# Patient Record
Sex: FEMALE | Race: ASIAN | NOT HISPANIC OR LATINO | Employment: FULL TIME | URBAN - METROPOLITAN AREA
[De-identification: names, ages, dates, MRNs, and addresses within clinical notes are randomized per-mention and may not be internally consistent; named-entity substitution may affect disease eponyms.]

---

## 2017-09-13 ENCOUNTER — PATIENT OUTREACH (OUTPATIENT)
Dept: ADMINISTRATIVE | Facility: HOSPITAL | Age: 27
End: 2017-09-13

## 2017-09-27 ENCOUNTER — OFFICE VISIT (OUTPATIENT)
Dept: INTERNAL MEDICINE | Facility: CLINIC | Age: 27
End: 2017-09-27
Attending: FAMILY MEDICINE
Payer: COMMERCIAL

## 2017-09-27 VITALS
OXYGEN SATURATION: 97 % | SYSTOLIC BLOOD PRESSURE: 128 MMHG | WEIGHT: 142.44 LBS | HEIGHT: 68 IN | DIASTOLIC BLOOD PRESSURE: 68 MMHG | HEART RATE: 57 BPM | BODY MASS INDEX: 21.59 KG/M2

## 2017-09-27 DIAGNOSIS — Z12.4 PAP SMEAR FOR CERVICAL CANCER SCREENING: ICD-10-CM

## 2017-09-27 DIAGNOSIS — H18.609 KERATOCONUS, UNSPECIFIED LATERALITY: ICD-10-CM

## 2017-09-27 DIAGNOSIS — Z00.00 ANNUAL PHYSICAL EXAM: Primary | ICD-10-CM

## 2017-09-27 PROCEDURE — 99999 PR PBB SHADOW E&M-EST. PATIENT-LVL III: CPT | Mod: PBBFAC,,, | Performed by: FAMILY MEDICINE

## 2017-09-27 PROCEDURE — 99395 PREV VISIT EST AGE 18-39: CPT | Mod: S$GLB,,, | Performed by: FAMILY MEDICINE

## 2017-09-27 NOTE — PROGRESS NOTES
"Subjective:      Patient ID: Shikha Lassiter is a 27 y.o. female.    Chief Complaint: Annual Exam    She is here for annual exam. Her left ear has been hurting for two weeks. No fevers, sinus pressure, runny nose. No worsening edema.       Review of Systems   Constitutional: Negative.    HENT: Negative.    Respiratory: Negative.    Cardiovascular: Negative.    Gastrointestinal: Negative.    Genitourinary: Negative.    Neurological: Negative.      I personally reviewed Past Medical History, Past Surgical history,  Past Social History and Family History    Objective:   /68   Pulse (!) 57   Ht 5' 8" (1.727 m)   Wt 64.6 kg (142 lb 6.7 oz)   SpO2 97%   BMI 21.65 kg/m²     Physical Exam   Constitutional: She is oriented to person, place, and time. She appears well-developed and well-nourished. No distress.   HENT:   Head: Normocephalic and atraumatic.   Right Ear: Hearing, tympanic membrane, external ear and ear canal normal.   Left Ear: Hearing, tympanic membrane, external ear and ear canal normal.   Nose: Nose normal.   Mouth/Throat: Uvula is midline and oropharynx is clear and moist.   Eyes: Conjunctivae and EOM are normal. Pupils are equal, round, and reactive to light. Right eye exhibits no discharge. Left eye exhibits no discharge. No scleral icterus.   Neck: Normal range of motion. Neck supple.   Cardiovascular: Normal rate, regular rhythm, normal heart sounds and intact distal pulses.  Exam reveals no gallop.    No murmur heard.  Pulmonary/Chest: Effort normal and breath sounds normal. No respiratory distress. She has no wheezes. She has no rales. She exhibits no tenderness.   Abdominal: Soft. Bowel sounds are normal. She exhibits no distension and no mass. There is no tenderness. There is no rebound and no guarding.   Musculoskeletal: Normal range of motion.   Neurological: She is alert and oriented to person, place, and time.   Skin: Skin is warm and dry.   Psychiatric: She has a normal mood and affect. " Her behavior is normal. Judgment and thought content normal.   Vitals reviewed.      Shikha was seen today for annual exam.    Diagnoses and all orders for this visit:    Annual physical exam  -patient to send in labs completed 4 months ago     Pap smear for cervical cancer screening  -declined pap smear   -     Ambulatory referral to Obstetrics / Gynecology    Keratoconus, unspecified laterality  -follow up with optho in the next few weeks     Ear pain  -negative exam, she will call if no improvement, she will trial mouth guard

## 2017-10-24 ENCOUNTER — INITIAL CONSULT (OUTPATIENT)
Dept: OPHTHALMOLOGY | Facility: CLINIC | Age: 27
End: 2017-10-24
Attending: OPHTHALMOLOGY
Payer: COMMERCIAL

## 2017-10-24 DIAGNOSIS — H18.603 KERATOCONUS OF BOTH EYES: Primary | ICD-10-CM

## 2017-10-24 PROCEDURE — 92004 COMPRE OPH EXAM NEW PT 1/>: CPT | Mod: S$GLB,,, | Performed by: OPHTHALMOLOGY

## 2017-10-24 PROCEDURE — 99999 PR PBB SHADOW E&M-EST. PATIENT-LVL II: CPT | Mod: PBBFAC,,, | Performed by: OPHTHALMOLOGY

## 2017-10-24 NOTE — PROGRESS NOTES
HPI     26 y/o female presents for [facility] Evaluation. Previous patient of Dr Mcnally   and Dr Gee.  Pt states OS is very steep.  Not seeing well out of it even with CTL   (soft)  Allergic to OptiFree  No Drops.     Last edited by Esmer Medina on 10/24/2017  3:23 PM. (History)            Assessment /Plan     For exam results, see Encounter Report.    Keratoconus of both eyes      The diagnosis of corneal ectasia and its etiology and clinical course were discussed.  Treatment with the use of specialty contact lenses, collagen cross linking, and corneal transplantation was explained in detail.  Plan  CXL OS first, then OD

## 2017-10-27 ENCOUNTER — TELEPHONE (OUTPATIENT)
Dept: OPHTHALMOLOGY | Facility: CLINIC | Age: 27
End: 2017-10-27

## 2017-10-27 NOTE — TELEPHONE ENCOUNTER
Left message for patient to assist with scheduling Crosslinking procedure appointment with Dr. Miller.

## 2017-10-27 NOTE — TELEPHONE ENCOUNTER
----- Message from Mariann Riggins MA sent at 10/24/2017  4:35 PM CDT -----  MD Mariann Garcia MA         CXL OS first, then OD later   Self Pay

## 2017-10-30 ENCOUNTER — TELEPHONE (OUTPATIENT)
Dept: OPHTHALMOLOGY | Facility: CLINIC | Age: 27
End: 2017-10-30

## 2017-10-30 NOTE — TELEPHONE ENCOUNTER
Called and spoke to patient and she will give me a call back to see if she is going out of town for [place].

## 2017-12-03 ENCOUNTER — PATIENT MESSAGE (OUTPATIENT)
Dept: INTERNAL MEDICINE | Facility: CLINIC | Age: 27
End: 2017-12-03

## 2017-12-07 ENCOUNTER — TELEPHONE (OUTPATIENT)
Dept: INTERNAL MEDICINE | Facility: CLINIC | Age: 27
End: 2017-12-07

## 2017-12-13 ENCOUNTER — TELEPHONE (OUTPATIENT)
Dept: OPHTHALMOLOGY | Facility: CLINIC | Age: 27
End: 2017-12-13

## 2017-12-13 NOTE — TELEPHONE ENCOUNTER
Called patient to help assist with scheduling. Crosslinking procedure. Patient mailbox was full. Was not able to leave a message.

## 2017-12-13 NOTE — TELEPHONE ENCOUNTER
Patient called and mentioned that she wants to hold off the Crosslinking procedure until the summer when she can take the time off. Informed patient that I will forward the message over to the provider.

## 2018-06-11 ENCOUNTER — HOSPITAL ENCOUNTER (EMERGENCY)
Facility: HOSPITAL | Age: 28
Discharge: HOME | End: 2018-06-11
Attending: EMERGENCY MEDICINE
Payer: COMMERCIAL

## 2018-06-11 VITALS
HEART RATE: 64 BPM | RESPIRATION RATE: 16 BRPM | WEIGHT: 143 LBS | OXYGEN SATURATION: 100 % | HEIGHT: 68 IN | SYSTOLIC BLOOD PRESSURE: 106 MMHG | BODY MASS INDEX: 21.67 KG/M2 | DIASTOLIC BLOOD PRESSURE: 66 MMHG | TEMPERATURE: 98 F

## 2018-06-11 DIAGNOSIS — M54.2 NECK PAIN: ICD-10-CM

## 2018-06-11 DIAGNOSIS — S02.2XXA CLOSED FRACTURE OF NASAL BONE, INITIAL ENCOUNTER: Primary | ICD-10-CM

## 2018-06-11 LAB
B-HCG UR QL: NEGATIVE
CTP QC/QA: YES

## 2018-06-11 PROCEDURE — 99284 EMERGENCY DEPT VISIT MOD MDM: CPT | Mod: 25

## 2018-06-11 PROCEDURE — 99283 EMERGENCY DEPT VISIT LOW MDM: CPT | Mod: ,,, | Performed by: EMERGENCY MEDICINE

## 2018-06-11 PROCEDURE — 81025 URINE PREGNANCY TEST: CPT | Performed by: EMERGENCY MEDICINE

## 2018-06-11 NOTE — ED TRIAGE NOTES
Slipped and fell onto face. , struck nose- she pushed it back into place. States she was sweating  after fall and bottom lip began bleeding. C/O h/a , rt jaw pain and neck pain . Small lac to left lower lip. Denies LOC.AAOX4, skin w/d, respirations even and unlabored.

## 2018-06-11 NOTE — ED PROVIDER NOTES
"Encounter Date: 6/11/2018    SCRIBE #1 NOTE: I, Heather Huerta, am scribing for, and in the presence of,  Dr. Romeo . I have scribed the following portions of the note - Other sections scribed: HPI, ROS, PE.       History     Chief Complaint   Patient presents with    Facial Injury     slipped and fell hit nose , having nauea, was sweaty, denies loc, headaches     Time patient was seen by the provider: 1:49 PM      The patient is a 28 y.o. female with no known co-morbidities who presents to the ED with a complaint of a severe HA, nausea, neck pain, nasal swelling, lip laceration, and a moderate nosebleed s/p fall. The patient states that she was walking downstairs, tripped and fell face forward. She states that she thought her nose was broken at the time, and her father was able to "pop it back in place." She denies LOC, weakness or numbness, or loose teeth.         The history is provided by the patient and a relative.     Review of patient's allergies indicates:  No Known Allergies  Past Medical History:   Diagnosis Date    Keratoconus     Medical history non-contributory      Past Surgical History:   Procedure Laterality Date    WISDOM TOOTH EXTRACTION       Family History   Problem Relation Age of Onset    Diabetes Father     Stroke Father     Polycystic kidney disease Father     Diabetes Maternal Grandmother     No Known Problems Mother     No Known Problems Sister     No Known Problems Brother     No Known Problems Maternal Aunt     No Known Problems Maternal Uncle     No Known Problems Paternal Aunt     No Known Problems Paternal Uncle     No Known Problems Maternal Grandfather     No Known Problems Paternal Grandmother     No Known Problems Paternal Grandfather     Blindness Neg Hx     Cataracts Neg Hx     Glaucoma Neg Hx     Macular degeneration Neg Hx     Retinal detachment Neg Hx     Strabismus Neg Hx     Thyroid disease Neg Hx     Amblyopia Neg Hx     Cancer Neg Hx     " Hypertension Neg Hx      Social History   Substance Use Topics    Smoking status: Never Smoker    Smokeless tobacco: Never Used    Alcohol use No     Review of Systems   HENT: Positive for nosebleeds.         (+) nasal bridge swelling  (+) lip laceration  (-) loose teeth   Gastrointestinal: Positive for nausea.   Musculoskeletal: Positive for neck pain.   Neurological: Positive for headaches. Negative for syncope, weakness and numbness.   All other systems reviewed and are negative.      Physical Exam     Initial Vitals [06/11/18 1344]   BP Pulse Resp Temp SpO2   127/81 64 18 97.9 °F (36.6 °C) 98 %      MAP       --         Physical Exam    Nursing note and vitals reviewed.  Constitutional: She appears well-developed and well-nourished.   HENT:   Head: Normocephalic and atraumatic.   Swelling and tenderness to bridge of nose. Minimal right jaw tenderness. Left lateral lip laceration, superficial not suturable.    Eyes: Conjunctivae and EOM are normal. Pupils are equal, round, and reactive to light.   Neck: Normal range of motion. Neck supple.   Abdominal: Soft. Bowel sounds are normal. She exhibits no distension. There is no tenderness.   Musculoskeletal: Normal range of motion. She exhibits no edema.        Cervical back: She exhibits tenderness.   Normal gait.    Neurological: She is alert and oriented to person, place, and time. No sensory deficit.   Skin: Skin is warm and dry. Capillary refill takes less than 2 seconds. No rash noted.   Psychiatric: She has a normal mood and affect.         ED Course   Procedures  Labs Reviewed   POCT URINE PREGNANCY          CT Maxillofacial Without Contrast   Final Result      Somewhat comminuted fracture of the nasal bones bilaterally fracture deformity of the nasal bones bilaterally more prominent on the right.      Otherwise unremarkable noncontrast CT maxillofacial bones as detailed above      Clinical correlation advised         Electronically signed by: Jacoby Goodman  DO   Date:    06/11/2018   Time:    14:56      CT Head Without Contrast   Final Result      Unremarkable noncontrast CT head specifically without evidence for acute intracranial hemorrhage.  Clinical correlation and further evaluation as warranted.         Electronically signed by: Jacoby Goodman DO   Date:    06/11/2018   Time:    14:51      X-Ray Cervical Spine AP And Lateral   Final Result      Normal exam         Electronically signed by: Cristi Torres MD   Date:    06/11/2018   Time:    14:51           Medical Decision Making:   History:   Old Medical Records: I decided to obtain old medical records.  Clinical Tests:   Lab Tests: Ordered and Reviewed  Radiological Study: Ordered and Reviewed  ED Management:  Nasal fx was reduced in field by family member who is an emergency phsyician. Discussed r/b of radiography and family preferred rads.              Scribe Attestation:   Scribe #1: I performed the above scribed service and the documentation accurately describes the services I performed. I attest to the accuracy of the note.               Clinical Impression:   The primary encounter diagnosis was Closed fracture of nasal bone, initial encounter. A diagnosis of Neck pain was also pertinent to this visit.                             Juan Francisco Hua MD  06/11/18 5086

## 2018-06-11 NOTE — ED NOTES
ER tech takes pt and  to radiology desk in clinic to obtain  Hard copy of CT  And xrays done today.

## 2018-06-11 NOTE — DISCHARGE INSTRUCTIONS
Follow up with plastics in 2 weeks (after swelling goes down) to assess if corrections are required.

## 2018-06-11 NOTE — ED NOTES
LOC: The patient is awake and alert; oriented x 3 and speaking appropriately.  APPEARANCE: Patient resting comfortably, patient is clean and well groomed  SKIN: warm and dry, normal skin turgor & moist mucus membranes, skin intact, no breakdown noted.lac to left lower lip.  MUSCULOSKELETAL: Patient moving all extremities well, no obvious swelling or deformities noted, slight deformity to nasal septum area, slight nose bleed.    RESPIRATORY: Airway is open and patent, ; respirations are spontaneous, normal effort and rate  CARDIAC: Patient has a normal rate, no peripheral edema noted, capillary refill < 3 seconds; No complaints of chest pain   ABDOMEN: Soft and denies abd pain

## 2019-03-15 ENCOUNTER — HOSPITAL ENCOUNTER (OUTPATIENT)
Dept: RADIOLOGY | Facility: HOSPITAL | Age: 29
Discharge: HOME | End: 2019-03-15
Attending: FAMILY MEDICINE
Payer: COMMERCIAL

## 2019-03-15 ENCOUNTER — TELEPHONE (OUTPATIENT)
Dept: FAMILY MEDICINE | Facility: CLINIC | Age: 29
End: 2019-03-15

## 2019-03-15 DIAGNOSIS — R50.9 FEVER, UNSPECIFIED FEVER CAUSE: ICD-10-CM

## 2019-03-15 DIAGNOSIS — R05.9 COUGH: ICD-10-CM

## 2019-03-15 DIAGNOSIS — R05.9 COUGH: Primary | ICD-10-CM

## 2019-03-15 PROCEDURE — 71046 X-RAY EXAM CHEST 2 VIEWS: CPT | Mod: 26,,, | Performed by: RADIOLOGY

## 2019-03-15 PROCEDURE — 71046 X-RAY EXAM CHEST 2 VIEWS: CPT | Mod: TC,FY,PO

## 2019-03-15 PROCEDURE — 71046 XR CHEST PA AND LATERAL: ICD-10-PCS | Mod: 26,,, | Performed by: RADIOLOGY

## 2019-07-24 ENCOUNTER — TELEPHONE (OUTPATIENT)
Dept: OPHTHALMOLOGY | Facility: CLINIC | Age: 29
End: 2019-07-24

## 2019-07-24 NOTE — TELEPHONE ENCOUNTER
Spoke with pt and recommended her to See Dr. Simeon for contacts since she didn't care for the last doctor she seen who did her contacts. Pt also has keratoconus. SDF

## 2019-07-24 NOTE — TELEPHONE ENCOUNTER
----- Message from Larry Mercado sent at 7/24/2019  1:55 PM CDT -----  Contact: Shikha Lassiter would like for you to contact her. She would like to know the name of the cornea specialist that Dr. Miller want to have an eye exam with. She can be reached at [phone].

## 2019-08-04 ENCOUNTER — TELEPHONE (OUTPATIENT)
Dept: OPHTHALMOLOGY | Facility: HOSPITAL | Age: 29
End: 2019-08-04

## 2019-08-04 NOTE — TELEPHONE ENCOUNTER
Ms. Lassiter called this morning regarding eye complaint. She reports that she sees Dr. Miller. She states that she slept in her contacts on Friday night and when she woke up on Saturday, both of her eyes were red and painful with the left eye worse than the right. She states that she began having mucus discharge in the left eye as well so she took her contacts out and wore only her glasses. She says that this morning she woke up and her right eye redness had resolved, however, her left eye was much worse. She had worse redness, discharge, and left eye pain. Her vision in the left eye is blurry. She states that she thinks she has an infection.    I recommended the patient go the the [facility] to be evaluated by Ophthalmology. She asked if this problem could wait until tomorrow and be seen in clinic. I recommended again that the patient report to the ED for ophthalmology evaluation. I discussed the risks of waiting until tomorrow which include but is not limited to permanent vision loss, corneal ulcer, and infection. Patient stated she would go to the ED.    Best contact information for Ms. Lassiter: [phone].    Esmer Aggarwal MD  Ophthalmology, PGY2  8/4/2019  1:47 PM

## 2019-08-04 NOTE — TELEPHONE ENCOUNTER
Received callback from Ms. Lassiter at 2:30PM. She reports that she went to urgent care on the [facility] instead of going to the ED as recommended. She states they did fluorescein staining and noted a left corneal abrasion and started her on antibiotic drops. She reports that she will wait until tomorrow for Ophthalmology evaluation. I recommended again that the patient report to the ED for ophthalmology evaluation today. I discussed the risks of waiting until tomorrow which include but is not limited to permanent vision loss, corneal ulcer, and infection. Patient reports she understands risks and would like to come to clinic tomorrow. We will call patient in the morning to schedule appointment. Best contact number is [phone].     Esmer Aggarwal MD  8/4/2019   2:56 PM

## 2019-08-05 ENCOUNTER — OFFICE VISIT (OUTPATIENT)
Dept: OPTOMETRY | Facility: CLINIC | Age: 29
End: 2019-08-05
Payer: COMMERCIAL

## 2019-08-05 ENCOUNTER — PATIENT MESSAGE (OUTPATIENT)
Dept: OPTOMETRY | Facility: CLINIC | Age: 29
End: 2019-08-05

## 2019-08-05 DIAGNOSIS — S05.02XA ABRASION OF LEFT CORNEA, INITIAL ENCOUNTER: Primary | ICD-10-CM

## 2019-08-05 PROCEDURE — 99999 PR PBB SHADOW E&M-EST. PATIENT-LVL II: CPT | Mod: PBBFAC,,, | Performed by: OPTOMETRIST

## 2019-08-05 PROCEDURE — 99999 PR PBB SHADOW E&M-EST. PATIENT-LVL II: ICD-10-PCS | Mod: PBBFAC,,, | Performed by: OPTOMETRIST

## 2019-08-05 PROCEDURE — 92002 PR EYE EXAM, NEW PATIENT,INTERMED: ICD-10-PCS | Mod: S$GLB,,, | Performed by: OPTOMETRIST

## 2019-08-05 PROCEDURE — 92002 INTRM OPH EXAM NEW PATIENT: CPT | Mod: S$GLB,,, | Performed by: OPTOMETRIST

## 2019-08-05 RX ORDER — FLUOROMETHOLONE 1 MG/ML
1 SUSPENSION/ DROPS OPHTHALMIC 4 TIMES DAILY
Qty: 5 ML | Refills: 0 | Status: SHIPPED | OUTPATIENT
Start: 2019-08-05 | End: 2019-08-09

## 2019-08-05 NOTE — PROGRESS NOTES
HPI     Pt fell asleep in contacts on Friday Saturday woke up OU were red and irritated. Had tearing and discharge out   of OU. Pt says Sunday pt says OS>OD was very painful about a 7 and   sensitive to light and felt like something was in OS. Had blurry vision.   Pt says she went to urgent care yesterday and was told she has corneal   abrasion in OS. Was given cipro and erythromycin for OS only. Pt says OD   has cleared up. Today she says OS has gotten a lot better since yesterday.   Still has a blurred spot and watery.     Last edited by Juliana Flores on 8/5/2019  3:09 PM. (History)            Assessment /Plan     For exam results, see Encounter Report.    Abrasion of left cornea, initial encounter   Continue with emycin QHS and Cipro QID, start fluorometholone 0.1% (FML) 0.1 % DrpS; Place 1 drop into both eyes 4 (four) times daily. for 4 days     Return Thursday for follow up, refraction and DFE    Schedule CL fit with  for kconus OS>OD

## 2019-08-06 ENCOUNTER — TELEPHONE (OUTPATIENT)
Dept: OPTOMETRY | Facility: CLINIC | Age: 29
End: 2019-08-06

## 2019-08-08 ENCOUNTER — OFFICE VISIT (OUTPATIENT)
Dept: OPTOMETRY | Facility: CLINIC | Age: 29
End: 2019-08-08
Payer: COMMERCIAL

## 2019-08-08 DIAGNOSIS — H52.213 IRREGULAR ASTIGMATISM OF BOTH EYES: Primary | ICD-10-CM

## 2019-08-08 DIAGNOSIS — H52.223 REGULAR ASTIGMATISM OF BOTH EYES: ICD-10-CM

## 2019-08-08 DIAGNOSIS — H18.603 KERATOCONUS OF BOTH EYES: ICD-10-CM

## 2019-08-08 DIAGNOSIS — H04.123 DRY EYE SYNDROME, BILATERAL: ICD-10-CM

## 2019-08-08 PROCEDURE — 99999 PR PBB SHADOW E&M-EST. PATIENT-LVL II: CPT | Mod: PBBFAC,,, | Performed by: OPTOMETRIST

## 2019-08-08 PROCEDURE — 92015 DETERMINE REFRACTIVE STATE: CPT | Mod: S$GLB,,, | Performed by: OPTOMETRIST

## 2019-08-08 PROCEDURE — 99999 PR PBB SHADOW E&M-EST. PATIENT-LVL II: ICD-10-PCS | Mod: PBBFAC,,, | Performed by: OPTOMETRIST

## 2019-08-08 PROCEDURE — 92014 PR EYE EXAM, EST PATIENT,COMPREHESV: ICD-10-PCS | Mod: S$GLB,,, | Performed by: OPTOMETRIST

## 2019-08-08 PROCEDURE — 92014 COMPRE OPH EXAM EST PT 1/>: CPT | Mod: S$GLB,,, | Performed by: OPTOMETRIST

## 2019-08-08 PROCEDURE — 92015 PR REFRACTION: ICD-10-PCS | Mod: S$GLB,,, | Performed by: OPTOMETRIST

## 2019-08-08 NOTE — PROGRESS NOTES
HPI     Pt says OS feeling a lot better  Pt says OD was feeling itchy and irritated about 2 days ago used the   ointment and it was better  Pt still having some itching and dryness in OS today  Been using cipro and FML OS     Last edited by Juliana Flores on 8/8/2019  3:35 PM. (History)            Assessment /Plan     For exam results, see Encounter Report.    Irregular astigmatism of both eyes     Keratoconus of both eyes   ? prgression since 2014 based on Spec Rx   Needs repeat topography   Consult Dr. Simeon for specialty contact lens fit    Dry eye syndrome, bilateral   Use FML BID x 4 days, then daily x 1 week   Use refresh PM ointment nightly   Use art tears daily   Consider Xiidra or restasis in the future    Good internal ocular health today, monitor yearly with DFE

## 2019-10-25 ENCOUNTER — OFFICE VISIT (OUTPATIENT)
Dept: OPTOMETRY | Facility: CLINIC | Age: 29
End: 2019-10-25
Payer: COMMERCIAL

## 2019-10-25 ENCOUNTER — TELEPHONE (OUTPATIENT)
Dept: OPHTHALMOLOGY | Facility: CLINIC | Age: 29
End: 2019-10-25

## 2019-10-25 DIAGNOSIS — H52.213 IRREGULAR ASTIGMATISM OF BOTH EYES: Primary | ICD-10-CM

## 2019-10-25 PROCEDURE — 92310 PR CONTACT LENS FITTING (NO CHANGE): ICD-10-PCS | Mod: CSM,,, | Performed by: OPTOMETRIST

## 2019-10-25 PROCEDURE — 99499 NO LOS: ICD-10-PCS | Mod: S$GLB,,, | Performed by: OPTOMETRIST

## 2019-10-25 PROCEDURE — 99499 UNLISTED E&M SERVICE: CPT | Mod: S$GLB,,, | Performed by: OPTOMETRIST

## 2019-10-25 PROCEDURE — 92310 CONTACT LENS FITTING OU: CPT | Mod: CSM,,, | Performed by: OPTOMETRIST

## 2019-10-25 NOTE — PROGRESS NOTES
HPI     Shikha Lassiter is a/an 29 y.o. female who returns  for continued eye care  Bilateral keratoconus, irregular astigmatism  [place] rewetting drops if needed when wearing her soft contact lenses  DW, 2 wks (tear very easily)   Failed GP      Last edited by Manuel Simeon, OD on 10/25/2019  2:08 PM. (History)            Assessment /Plan     For exam results, see Encounter Report.    Irregular astigmatism of both eyes  Contact Lens Final Rx     Final Contact Lens Rx       Brand Base Curve Diameter Sphere Cylinder Axis Lens    Right Synergeyes VS 8.4 16.0 +0.25 Sphere  3400 36/42    Left Synergeyes VS 8.4 16.0 +1.00 Sphere  3400 36/42          Final Contact Lens Rx #2       Brand Base Curve Diameter Sphere Cylinder Axis Lens    Right Bauch and Lomb Ultra for Astigmatism   -3.75 -2.75 020     Left Bauch and Lomb Ultra for Astigmatism   -5.00 -2.75 130               Order trial SCLs as temporary lenses, pt wishes to proceed with CXL best to hold scleral lenses till after   Needs CLFU for dispense  Refit Sceral lenses after CXL    RTC dispense, Paul CXL consult

## 2019-11-12 ENCOUNTER — TELEPHONE (OUTPATIENT)
Dept: OPTOMETRY | Facility: CLINIC | Age: 29
End: 2019-11-12

## 2019-11-15 ENCOUNTER — OFFICE VISIT (OUTPATIENT)
Dept: OPTOMETRY | Facility: CLINIC | Age: 29
End: 2019-11-15
Payer: COMMERCIAL

## 2019-11-15 DIAGNOSIS — H52.213 IRREGULAR ASTIGMATISM OF BOTH EYES: Primary | ICD-10-CM

## 2019-11-15 PROCEDURE — 92499 PR CONTACT LENS F/U LEV 1: ICD-10-PCS | Mod: ,,, | Performed by: OPTOMETRIST

## 2019-11-15 PROCEDURE — 99499 UNLISTED E&M SERVICE: CPT | Mod: S$GLB,,, | Performed by: OPTOMETRIST

## 2019-11-15 PROCEDURE — 92499 UNLISTED OPH SVC/PROCEDURE: CPT | Mod: ,,, | Performed by: OPTOMETRIST

## 2019-11-15 PROCEDURE — 99499 NO LOS: ICD-10-PCS | Mod: S$GLB,,, | Performed by: OPTOMETRIST

## 2019-11-15 NOTE — PROGRESS NOTES
HPI     Patient is here for clfu and clpu soft lens  Slight blur over previous      Last edited by Manuel Simeon, OD on 11/15/2019  8:29 AM. (History)            Assessment /Plan     For exam results, see Encounter Report.    Irregular astigmatism of both eyes  -Order new trials  -Ok to dispense  -follow up gutierrez for CXL      RTC 1 yr, Scleral lens fit

## 2019-12-11 ENCOUNTER — TELEPHONE (OUTPATIENT)
Dept: OPHTHALMOLOGY | Facility: CLINIC | Age: 29
End: 2019-12-11

## 2019-12-11 NOTE — TELEPHONE ENCOUNTER
----- Message from María Ribeiro sent at 12/11/2019  1:28 PM CST -----  Contact: pt  Pt is calling to schedule a appt.  Please call pt.

## 2019-12-18 ENCOUNTER — TELEPHONE (OUTPATIENT)
Dept: OPTOMETRY | Facility: CLINIC | Age: 29
End: 2019-12-18

## 2019-12-18 NOTE — TELEPHONE ENCOUNTER
Final  Contact Lens Final Rx     Final Contact Lens Rx       Brand Base Curve Diameter Sphere Cylinder Axis    Right Bauch and Lomb Ultra for Astigmatism 8.6 14.5 -4.75 -2.75 030    Left Bauch and Lomb Ultra for Astigmatism 8.6 14.5 -5.50 -2.25 140    Expiration Date:  12/18/2020    Replacement:  Monthly    Solutions:  OptiFree PureMoist    Wearing Schedule:  Daily wear

## 2021-08-19 NOTE — TELEPHONE ENCOUNTER
Informed pt of lab results and advice. Pt demonstrated verbal understanding of information and had no further questions or concerns at this time.     
Please inform Your liver electrolytes and kidney function are normal   Your complete blood count is normal. You are not anemic.   Your thyroid studies are normal.     
no

## 2022-11-09 DIAGNOSIS — R52 PAIN: Primary | ICD-10-CM

## 2022-11-15 ENCOUNTER — HOSPITAL ENCOUNTER (OUTPATIENT)
Dept: RADIOLOGY | Facility: HOSPITAL | Age: 32
Discharge: HOME | End: 2022-11-15
Attending: ORTHOPAEDIC SURGERY
Payer: COMMERCIAL

## 2022-11-15 ENCOUNTER — OFFICE VISIT (OUTPATIENT)
Dept: ORTHOPEDICS | Facility: CLINIC | Age: 32
End: 2022-11-15
Payer: COMMERCIAL

## 2022-11-15 DIAGNOSIS — M76.72 PERONEAL TENDINITIS, LEFT: Primary | ICD-10-CM

## 2022-11-15 DIAGNOSIS — M21.6X2 HINDFOOT PRONATION, LEFT: ICD-10-CM

## 2022-11-15 DIAGNOSIS — M62.462 GASTROCNEMIUS EQUINUS, LEFT: ICD-10-CM

## 2022-11-15 DIAGNOSIS — R52 PAIN: ICD-10-CM

## 2022-11-15 PROCEDURE — 99999 PR PBB SHADOW E&M-EST. PATIENT-LVL II: CPT | Mod: PBBFAC,,, | Performed by: ORTHOPAEDIC SURGERY

## 2022-11-15 PROCEDURE — 99999 PR PBB SHADOW E&M-EST. PATIENT-LVL II: ICD-10-PCS | Mod: PBBFAC,,, | Performed by: ORTHOPAEDIC SURGERY

## 2022-11-15 PROCEDURE — 73610 X-RAY EXAM OF ANKLE: CPT | Mod: 26,LT,, | Performed by: RADIOLOGY

## 2022-11-15 PROCEDURE — 73610 XR ANKLE COMPLETE 3 VIEW LEFT: ICD-10-PCS | Mod: 26,LT,, | Performed by: RADIOLOGY

## 2022-11-15 PROCEDURE — 73610 X-RAY EXAM OF ANKLE: CPT | Mod: TC,PO,LT

## 2022-11-15 PROCEDURE — 99203 OFFICE O/P NEW LOW 30 MIN: CPT | Mod: S$GLB,,, | Performed by: ORTHOPAEDIC SURGERY

## 2022-11-15 PROCEDURE — 1159F MED LIST DOCD IN RCRD: CPT | Mod: CPTII,S$GLB,, | Performed by: ORTHOPAEDIC SURGERY

## 2022-11-15 PROCEDURE — 99203 PR OFFICE/OUTPT VISIT, NEW, LEVL III, 30-44 MIN: ICD-10-PCS | Mod: S$GLB,,, | Performed by: ORTHOPAEDIC SURGERY

## 2022-11-15 PROCEDURE — 1159F PR MEDICATION LIST DOCUMENTED IN MEDICAL RECORD: ICD-10-PCS | Mod: CPTII,S$GLB,, | Performed by: ORTHOPAEDIC SURGERY

## 2022-11-15 NOTE — PROGRESS NOTES
Subjective:   Chief complaint: left ankle pain  Referring provider: Aaareferral Self     HPI:   Shikha Lassiter is a 32 y.o. female who presents today for evaluation of left ankle pain.  Rates pain as 2/10.  Pain has been ongoing for a few months.  Inciting event: sprained 3 years ago.  Treatments tried: none.    Intermittent pain related with prolonged standing and ambulating.  Pain is primarily lateral but also medial.  No instability.    Works as DMD.    Does the patient use tobacco products? No  If so, what and how often? N/A    ROS:  Musculoskeletal: per HPI  Neurological: Negative for burning, tingling and numbness  Heme: Negative for blood thinners; Negative for history of blood clot  Endocrine: Negative for diabetes    Objective:   Exam:  There were no vitals filed for this visit.  General: No acute distress, well-appearing  Neurologic: Alert and oriented x3  Psychiatric: Appropriate mood and affect, cooperative  Cardiovascular: Regular pulse  Respiratory: Breathing on room air  Skin: No rashes or ulcers  Vascular: palpable DP/PT 2+  Musculoskeletal: Standing examination demonstrates slight midfoot sag with single leg balance.  There is mild lateral ankle swelling.  There is no ecchymosis.    Focused exam of the left lower extremity demonstrates irritable ankle range of motion.  Stability testing deferred.  TTP about ATFL, inferior lateral mall.    TA and Achilles palpate in continuity.  Fires 5/5 TA/GSC/PTT/peroneals without instability.  Peroneals stable in their groove without associated TTP and without crepitus.  SILT SP/DP/PT and able to localize.     Imaging:  Radiographs were ordered and independently interpreted by me.    Standing 3v left ankle demonstrate no acute osseous abnormalities.  Ankle mortis maintained and congruent.      Assessment:     1. Peroneal tendinitis, left    2. Hindfoot pronation, left    3. Gastrocnemius equinus, left         Patient is seen for a new problem without complications  "expected from treatment.    Data:  1 results independently interpreted    Treatment plan: Low risk of morbidity from treatment plan     I reviewed imaging, clinical history, and diagnosis as above with the patient. I attempted to use layman's terms to educate the patient as well as utilize foot models and/or pictures.   I personally went through imaging with the patient.      I discussed this likely represents the sequela or group of things I call "ankle sprains that don't get better."  I reviewed the variety of processes (intra-articular and extra-articular) that can contribute to ongoing pain and/or dysfunction.  At this point, I discussed the next step is formal dedicated PT to work on gastroc contracture and peroneal weakness.        Plan:       1.  Therapy: Formal physical therapy referral provided  2.  Symptomatic treatment: Nothing needed  3.  Restrictions: Advance activity as tolerated, use pain as guide  4.  Brace/orthotics/etc: none but discussed stability shoeware  5.  Follow-up: 6 weeks with no x-rays needed if symptoms not resolved     Orders Placed This Encounter   Procedures    Ambulatory referral/consult to Physical/Occupational Therapy     Standing Status:   Future     Standing Expiration Date:   12/15/2023     Referral Priority:   Routine     Referral Type:   Physical Medicine     Referral Reason:   Specialty Services Required     Requested Specialty:   Physical Therapy     Number of Visits Requested:   1       Past Medical History:   Diagnosis Date    Keratoconus     Medical history non-contributory        Past Surgical History:   Procedure Laterality Date    WISDOM TOOTH EXTRACTION         Family History   Problem Relation Age of Onset    Diabetes Father     Stroke Father     Polycystic kidney disease Father     Diabetes Maternal Grandmother     No Known Problems Mother     No Known Problems Sister     No Known Problems Brother     No Known Problems Maternal Aunt     No Known Problems Maternal " Uncle     No Known Problems Paternal Aunt     No Known Problems Paternal Uncle     No Known Problems Maternal Grandfather     No Known Problems Paternal Grandmother     No Known Problems Paternal Grandfather     Blindness Neg Hx     Cataracts Neg Hx     Glaucoma Neg Hx     Macular degeneration Neg Hx     Retinal detachment Neg Hx     Strabismus Neg Hx     Thyroid disease Neg Hx     Amblyopia Neg Hx     Cancer Neg Hx     Hypertension Neg Hx        Social History     Socioeconomic History    Marital status: Single   Occupational History    Occupation: dental student    Tobacco Use    Smoking status: Never    Smokeless tobacco: Never   Substance and Sexual Activity    Alcohol use: No    Drug use: No    Sexual activity: Never

## 2022-11-15 NOTE — PATIENT INSTRUCTIONS
"Today, you saw Dr. Wilkins and were diagnosed with peroneal tendinitis with touch of ankle instability    We decided the next step(s) in in treatment is/are  RICE guidelines (see below)  Anti-inflammatory medications (see below)  Therapy: Formal physical therapy referral provided  Activity: Use pain as your guide, advance your activities as tolerated   Hoka Bondie or New balance fresh foam shoes    Thank you for allowing me to participate in your care.  We will see you back in 2 months.    How to treat inflammation?  1.) What does your doctor mean when they tell you to follow R.I.C.E. Guidelines?    Rest your ankle/foot by limiting activities that cause pain.  A good indicator of activity level is your pain the night following the activity and the day after.  If you have pain that lasts until the next day, you did too much.  Ice it to keep the swelling down. Don't put ice directly on the skin (use a thin piece of cloth between the ice bag and skin) and don't ice more than 20 minutes at a time to avoid frostbite.  Compressive bandages immobilize and support your injury.  Make sure it isn't too tight - your toes should not be turning purple and the wrap should not hurt.  Elevate your ankle above your heart level - "toes above your nose". This applies to acute injuries and should be followed for first 48 hours as well as afterward when you have increased pain and swelling after activity or therapy.    2.) Another common way of treating pain and inflammation from an injury is anti-inflammatory medications.  Dr. Wilkins may prescribe you a medication for inflammation or you can take an over-the-counter anti-inflammatory (also known as NSAIDs - nonsteroidal anti-inflammatory drugs).  These include such medications as aleve, motrin, ibuprofen, naproxen, etc.  They should be taken as prescribed or according to the over-the-counter packaging instructions.  These medications can upset the stomach or rare cases causes ulcer " disease or kidney injury.  If you are concerned about using these medications long-term, you should discuss it with you primary doctor.  You can also combine or substitute an NSAID with acetaminophen (commonly known as Tylenol).  Take according to bottle instructions, and you can take up to 3000 mg daily (important to keep in mind if you take other medications that contain acetaminophen).  Again long term use should be discussed with your primary doctor.

## 2022-12-07 ENCOUNTER — TELEPHONE (OUTPATIENT)
Dept: ORTHOPEDICS | Facility: CLINIC | Age: 32
End: 2022-12-07
Payer: COMMERCIAL

## 2022-12-20 ENCOUNTER — CLINICAL SUPPORT (OUTPATIENT)
Dept: REHABILITATION | Facility: HOSPITAL | Age: 32
End: 2022-12-20
Payer: COMMERCIAL

## 2022-12-20 DIAGNOSIS — M76.72 PERONEAL TENDINITIS, LEFT: ICD-10-CM

## 2022-12-20 DIAGNOSIS — R53.1 DECREASED STRENGTH: ICD-10-CM

## 2022-12-20 DIAGNOSIS — M21.6X2 HINDFOOT PRONATION, LEFT: ICD-10-CM

## 2022-12-20 DIAGNOSIS — M62.462 GASTROCNEMIUS EQUINUS, LEFT: ICD-10-CM

## 2022-12-20 PROCEDURE — 97161 PT EVAL LOW COMPLEX 20 MIN: CPT | Mod: PN

## 2022-12-20 PROCEDURE — 97110 THERAPEUTIC EXERCISES: CPT | Mod: PN

## 2022-12-20 NOTE — PLAN OF CARE
[facility]  Physical Therapy Initial Evaluation    Date: 12/20/2022   Name: Shikha Lassiter  Clinic Number: [id number]    Therapy Diagnosis:   Encounter Diagnoses   Name Primary?    Peroneal tendinitis, left     Hindfoot pronation, left     Gastrocnemius equinus, left     Decreased strength      Physician: Dianne Wilkins MD    Physician Orders: PT Eval and Treat  Medical Diagnosis from Referral:   M76.72 (ICD-10-CM) - Peroneal tendinitis, left   M21.6X2 (ICD-10-CM) - Hindfoot pronation, left   M21.862 (ICD-10-CM) - Gastrocnemius equinus, left     Evaluation Date: 12/20/2022  Authorization Period Expiration: 12/31/2022  Plan of Care Expiration: 2/17/23  Progress Note Due: 1/20/23  Visit # / Visits authorized: 1/3   FOTO: 1/5    Precautions: Standard     Time In: 8:30 AM  Time Out: 9:00 AM  Total Appointment Time (timed & untimed codes): 30 minutes (1 TE, 1 LCE)    SUBJECTIVE   Date of onset: 6/2022    History of current condition - Shikha reports left lateral ankle pain and plantar heel pain since as early as last December when she was on a pilgrimage. She does have a history of left ankle sprain playing basketball about 3 years ago with symptoms of pain and ankle swelling (was able to finish playing and could walk on it). No issues with left ankle since last year, and worsened since this past June of 2022 that coincided with a change of gym activities. The first 20 steps in the morning are the worst and then does fairly well throughout the day if she keeps it moving. Her left ankle will still swell at times if she is more active. She had increased pain if she wears heels, feels unbalanced standing on just her left leg, and she avoids heavy activity on her left leg out of caution. Does do Sofy at home 2-3x a week. Does have an ankle brace she wears at times.     Falls: None     Imaging, 11/9/2022 ankle x-ray  Ankle mortise is preserved.  Bony structures are intact.  No soft tissue swelling can  be seen.  Small plantar calcaneal spur is noted.    Prior Therapy: None   Social History: 2 story home with bedroom on 2nd floor  Occupation: Dentist  Prior Level of Function: no pain before last year  Current Level of Function: pain/swelling with prolonged standing/walking activities, first couple of steps in the morning.    Pain:  Current 2/10, worst 6/10, best 0/10   Location: distal to left lateral malleolus  Description: dull and aching  Aggravating Factors: standing and walking activities  Easing Factors: messaging it, getting off her feet    Patients goals: to no longer have pain     Medical History:   Past Medical History:   Diagnosis Date    Keratoconus     Medical history non-contributory      Surgical History:   Shikha Lassiter  has a past surgical history that includes [place] tooth extraction.    Medications:   Shikha currently has no medications in their medication list.    Allergies:   Review of patient's allergies indicates:  No Known Allergies     OBJECTIVE     Gait: WNL, mild pes planus  Posture: Bilateral mild pes planus with mild left lateral column compression  Sensation: WNL  Palpation: +tender to palpation at left ATFL insertion    A/PROM and MMT  * = left ankle pain with testing  NT = Not tested     Hip  Right   Left  Pain/Dysfunction with Movement   Flexion AROM PROM MMT AROM PROM MMT     WFL WFL 5/5 WFL WFL 5/5              Knee  Right   Left  Pain/Dysfunction with Movement    AROM PROM MMT AROM PROM MMT    Flexion (140 deg) WFL WFL 5/5 WFL WFL 5/5    Extension (0-5 deg) WFL WFL 5/5 WFL WFL 4/5      Ankle  Right   Left  Pain/Dysfunction with Movement    AROM PROM MMT AROM PROM MMT    Great toe (45/70 deg) -- -- NT -- -- NT    Plantarflexion (50 deg 45 45 4+/5 45 45 3+/5    Dorsiflexion (20 deg) 5 6 5/5 2 3 5/5    Inversion  (Ankle, not subtalar) 35 35 5/5 37 38 5/5 Hypermobile Bilaterally   Eversion  (Ankle, not subtalar) 20 21 5/5 20 20 4/5 Hypermobile Bilaterally     DL heel raise assessment =  calcaneal eversion  SL heel raise assessment = 16 on right, 5 on left    Special tests:  Anterior Drawer Test = slight laxity on left, firm end feel Bilateral   Calcaneofibular ligament stress test = negative left  Interdigital Neuroma Test = negative B  Talar Tilit Test = not tested  Navicular drop test = not tested   (Difference of >10 mm is considered significant excessive foot pronation.)  Tinel's Sign Test (tarsal tunnel syndrome) = not tested  Mendez test = no tested     Limitation/Restriction for FOTO Ankle Survey    Therapist reviewed FOTO scores for Shikha Lassiter on 12/20/2022.   FOTO documents entered into [employer] - see Media section.    Limitation Score: 36%  Predicted: 20%     TREATMENT   Total Treatment time (time-based codes) separate from Evaluation: 8 minutes      Shikha received the treatments listed below:      therapeutic exercises to develop strength, endurance, ROM, flexibility, posture, and core stabilization for 8 minutes including:  Seated ankle eversion with red theraband  Education on load management    Next:  Seated soleus heel raises with 30#  Seated cross legged ankle inversion with theraband   Double leg press on shuttle  Single leg heel raises on shuttle  Standing hip abduction  Single leg balance on airex    PATIENT EDUCATION AND HOME EXERCISES     Home Exercises and Patient Education Provided:  Education provided:   - proper foot wear  - course of therapy, prognosis  - importance of HEP    Written Home Exercises Provided: yes.  Exercises were reviewed and Shikha was able to demonstrate them prior to the end of the session.  Shikha demonstrated good  understanding of the education provided.     See EMR under Patient Instructions for exercises provided 12/20/2022.    ASSESSMENT   Shikha is a 32 y.o. female referred to outpatient Physical Therapy with a medical diagnosis of Gastrocnemius equinus, Hindfoot pronation, and Peroneal tendinitis presenting to PT at [facility]  [place]. Pt currently presents with left lateral ankle pain, decreased left ankle ROM, decreased LLE strength, impaired posture, impaired balance and gait, and functional deficits with squatting, jumping/running, and prolonged standing and walking activities. Pt presents with signs and symptoms of left low grade peroneal tendinopathy, gastroc weakness, pes planus, and lateral column compression syndrome.    Patient prognosis is Excellent.   Patient will benefit from skilled outpatient Physical Therapy to address the deficits stated above and in the chart below, provide patient /family education, and to maximize patientt's level of independence.     Plan of care discussed with patient: Yes  Pt's spiritual, cultural and educational needs considered and patient is agreeable to the plan of care and goals as stated below:     Anticipated Barriers for therapy: None    Medical Necessity is demonstrated by the following  History  Co-morbidities and personal factors that may impact the plan of care Co-morbidities:   None    Personal Factors:   no deficits     low   Examination  Body Structures and Functions, activity limitations and participation restrictions that may impact the plan of care Body Regions:   back  lower extremities  trunk    Body Systems:    gross symmetry  ROM  strength  gross coordinated movement  balance  gait  transfers  transitions  motor control  edema    Participation Restrictions:   basketball    Activity limitations:   Learning and applying knowledge  no deficits    General Tasks and Commands  no deficits    Communication  no deficits    Mobility  lifting and carrying objects    Self care  no deficits    Domestic Life  shopping  cooking  doing house work (cleaning house, washing dishes, laundry)    Interactions/Relationships  no deficits    Life Areas  no deficits    Community and Social Life  no deficits         high   Clinical Presentation stable and uncomplicated low   Decision Making/  Complexity Score: low     GOALS: Short Term Goals:  4 weeks  1. Report decreased left ankle pain </= 2/10 to increase tolerance for ADLs.  2. Increase left ankle AROM dorsiflexion to 5 degrees in order to walk with min to no compensation.  3. Pt will demo good sitting and standing posture for improved spine and joint alignment for improved biomechanics.  4. Pt to tolerate HEP to improve ROM and independence with ADL's.    Long Term Goals: 8 weeks  1. Report decreased left ankle pain </= 0/10 with squatting and double leg hopping to increase tolerance for increased QoL and improved ADLs.  2. Patient goal: to no longer have pain.  3. Increase strength to >/= 5/5 for BLE to increase tolerance for ADL and work activities.  4. Pt will report at </= 20% impaired on ANKLE FOTO score to demo increased functional mobility.   5. Pt will be able to ambulate community distances and negotiate stairs with minimal ankle pain for increased functional mobility and QoL.    PLAN   Plan of care Certification: 12/20/2022 to 2/17/23.    Outpatient Physical Therapy 2 times weekly for 8 weeks to include the following interventions: Cervical/Lumbar Traction, Electrical Stimulation, Gait Training, Manual Therapy, Moist Heat/ Ice, Neuromuscular Re-ed, Orthotic Management and Training, Patient Education, Self Care, Therapeutic Activities, and Therapeutic Exercise.     Brad Escalera, PT      I CERTIFY THE NEED FOR THESE SERVICES FURNISHED UNDER THIS PLAN OF TREATMENT AND WHILE UNDER MY CARE   Physician's comments:     Physician's Signature: ___________________________________________________

## 2022-12-21 ENCOUNTER — CLINICAL SUPPORT (OUTPATIENT)
Dept: REHABILITATION | Facility: HOSPITAL | Age: 32
End: 2022-12-21
Payer: COMMERCIAL

## 2022-12-21 DIAGNOSIS — M21.6X2 HINDFOOT PRONATION, LEFT: ICD-10-CM

## 2022-12-21 DIAGNOSIS — R53.1 DECREASED STRENGTH: Primary | ICD-10-CM

## 2022-12-21 DIAGNOSIS — M62.462 GASTROCNEMIUS EQUINUS, LEFT: ICD-10-CM

## 2022-12-21 DIAGNOSIS — M76.72 PERONEAL TENDINITIS, LEFT: ICD-10-CM

## 2022-12-21 PROCEDURE — 97110 THERAPEUTIC EXERCISES: CPT | Mod: PN

## 2022-12-21 PROCEDURE — 97140 MANUAL THERAPY 1/> REGIONS: CPT | Mod: PN

## 2022-12-21 NOTE — PROGRESS NOTES
"[facility]   Physical Therapy Treatment Note     Name: Shikha Lassiter  Clinic Number: [id number]    Therapy Diagnosis:   Encounter Diagnoses   Name Primary?    Decreased strength Yes    Peroneal tendinitis, left     Hindfoot pronation, left     Gastrocnemius equinus, left      Physician: Dianne Wilkins MD    Visit Date: 12/21/2022  Physician Orders: PT Eval and Treat  Medical Diagnosis from Referral:   M76.72 (ICD-10-CM) - Peroneal tendinitis, left   M21.6X2 (ICD-10-CM) - Hindfoot pronation, left   M21.862 (ICD-10-CM) - Gastrocnemius equinus, left      Evaluation Date: 12/20/2022  Authorization Period Expiration: 12/31/2022  Plan of Care Expiration: 2/17/23  Progress Note Due: 1/20/23  Visit # / Visits authorized: 2/3   FOTO: 2/5  PTA Visit #: 0/5   Precautions: Standard   Prefers Female only     Time In: 5:05  Time Out: 5:50  Total Billable Time: 45 minutes    SUBJECTIVE     Pt reports: no changes since yesterday. She did not get to try home exercise program yet.   She was not compliant with home exercise program.  Response to previous treatment: initial evaluation yesterday  Functional change: ongoing    Pain: 1/10  Location: distal to left lateral malleolus      OBJECTIVE     Objective Measures updated at progress report unless specified.     Treatment     Shikha received the treatments listed below:      therapeutic exercises to develop strength, endurance, ROM, flexibility, and posture for 37 minutes including:  Seated ankle eversion with red theraband 3x10   Seated soleus heel raises with 30# 3x10  Seated cross legged ankle inversion with theraband 3x10   Double leg press on shuttle 20x 2 cords top (increase next)  Single leg heel raises on shuttle 2x10 2 cords top  Shuttle leg press 3x10 3 cords top  Standing hip abduction red theraband 3x10  Single leg balance on airex 3x30"  Tandem balance 1/2 foam flat side up 2x30"    manual therapy techniques: Joint mobilizations were applied to " the: left ankle for 8 minutes, including:  Talocrural distraction grade IV  A/p mobilization grade III-IV     Patient Education and Home Exercises     Home Exercises Provided and Patient Education Provided     Education provided:   - continue home exercise program  -load management    Written Home Exercises Provided: Patient instructed to cont prior HEP. Exercises were reviewed and Shikha was able to demonstrate them prior to the end of the session.  Shikha demonstrated good  understanding of the education provided. See EMR under Patient Instructions for exercises provided during therapy sessions    ASSESSMENT   Shikha is a 32 y.o. female referred to outpatient Physical Therapy with a medical diagnosis of Gastrocnemius equinus, Hindfoot pronation, and Peroneal tendinitis presenting to PT at [facility]. Pt presents for first follow up visit with unremarkable change since yesterday's evaluation. Pt with low tissue irritability but mild tenderness to palpation of peroneal tendons at posterior lateral malleolus. Manual techniques performed to improve ankle dorsiflexion. Followed with ankle strength and proprioceptive training. Pt challenged with motor control with inversion/eversion band exercise. Min occasional fingertip support with balance activities. No increased pain reported post treatment. Continue to progress as tolerated.       Shikha Is progressing well towards her goals.   Pt prognosis is Excellent.     Pt will continue to benefit from skilled outpatient physical therapy to address the deficits listed in the problem list box on initial evaluation, provide pt/family education and to maximize pt's level of independence in the home and community environment.     Pt's spiritual, cultural and educational needs considered and pt agreeable to plan of care and goals.     Anticipated barriers to physical therapy: none    Goals:    Short Term Goals:  4 weeks  1. Report decreased left ankle pain  </= 2/10 to increase tolerance for ADLs. Progressing, not met  2. Increase left ankle AROM dorsiflexion to 5 degrees in order to walk with min to no compensation. Progressing, not met  3. Pt will demo good sitting and standing posture for improved spine and joint alignment for improved biomechanics. Progressing, not met  4. Pt to tolerate HEP to improve ROM and independence with ADL's.Progressing, not met     Long Term Goals: 8 weeks  1. Report decreased left ankle pain </= 0/10 with squatting and double leg hopping to increase tolerance for increased QoL and improved ADLs.Progressing, not met  2. Patient goal: to no longer have pain.Progressing, not met  3. Increase strength to >/= 5/5 for BLE to increase tolerance for ADL and work activities.Progressing, not met  4. Pt will report at </= 20% impaired on ANKLE FOTO score to demo increased functional mobility. Progressing, not met  5. Pt will be able to ambulate community distances and negotiate stairs with minimal ankle pain for increased functional mobility and QoL.Progressing, not met       PLAN   Plan of care Certification: 12/20/2022 to 2/17/23.  Ankle strength, balance, and proprioception.     BRENT SAUNDERS, PT

## 2023-01-25 ENCOUNTER — TELEPHONE (OUTPATIENT)
Dept: REHABILITATION | Facility: HOSPITAL | Age: 33
End: 2023-01-25
Payer: COMMERCIAL

## 2023-02-01 ENCOUNTER — CLINICAL SUPPORT (OUTPATIENT)
Dept: REHABILITATION | Facility: HOSPITAL | Age: 33
End: 2023-02-01
Payer: COMMERCIAL

## 2023-02-01 DIAGNOSIS — M21.6X2 HINDFOOT PRONATION, LEFT: ICD-10-CM

## 2023-02-01 DIAGNOSIS — M76.72 PERONEAL TENDINITIS, LEFT: ICD-10-CM

## 2023-02-01 DIAGNOSIS — R53.1 DECREASED STRENGTH: Primary | ICD-10-CM

## 2023-02-01 DIAGNOSIS — M62.462 GASTROCNEMIUS EQUINUS, LEFT: ICD-10-CM

## 2023-02-01 PROCEDURE — 97110 THERAPEUTIC EXERCISES: CPT | Mod: PN

## 2023-02-01 NOTE — PROGRESS NOTES
[facility]   Physical Therapy Treatment Note/ progress note    Name: Shikha Lassiter  Clinic Number: [id number]    Therapy Diagnosis:   Encounter Diagnoses   Name Primary?    Decreased strength Yes    Peroneal tendinitis, left     Hindfoot pronation, left     Gastrocnemius equinus, left        Physician: Dianne Wilkins MD    Visit Date: 2/1/2023  Physician Orders: PT Eval and Treat  Medical Diagnosis from Referral:   M76.72 (ICD-10-CM) - Peroneal tendinitis, left   M21.6X2 (ICD-10-CM) - Hindfoot pronation, left   M21.862 (ICD-10-CM) - Gastrocnemius equinus, left      Evaluation Date: 12/20/2022  Authorization Period Expiration: 12/31/2022  Plan of Care Expiration: 2/17/23  Progress Note Due: 3/1/23  Visit # / Visits authorized: 1/20   FOTO: 3/5  PTA Visit #: 0/5   Precautions: Standard   Prefers Female only     Time In: 5:07  Time Out: 6:00  Total Billable Time: 53 minutes    SUBJECTIVE     Pt reports: decreased pain from switching shoes from flats to heeled shoe. She has better tolerance for comfortable flats when she does wear them but can not wear very flat non-supportive shoes.  She was not compliant with home exercise program.  Response to previous treatment: initial evaluation yesterday  Functional change: ongoing    Pain: 1/10  Location: distal to left lateral malleolus      OBJECTIVE     Objective Measures updated at progress report unless specified.     Gait: WNL, mild pes planus  Posture: Bilateral mild pes planus with mild left lateral column compression  Sensation: WNL  Palpation: minimal tenderness at peroneal tendon at posterior malleolus      A/PROM and MMT  * = left ankle pain with testing  NT = Not tested     Hip   Right     Left   Pain/Dysfunction with Movement   Flexion AROM PROM MMT AROM PROM MMT       WFL WFL 5/5 WFL WFL 5/5                       Knee   Right     Left   Pain/Dysfunction with Movement     AROM PROM MMT AROM PROM MMT     Flexion (140 deg) WFL WFL 5/5 WFL WFL  "5/5     Extension (0-5 deg) WFL WFL 5/5 WFL WFL 4+/5        Ankle   Right     Left   Pain/Dysfunction with Movement     AROM PROM MMT AROM PROM MMT     Great toe (45/70 deg) -- -- NT -- -- NT     Plantarflexion (50 deg 47 47 4+/5 48 48 4+/5     Dorsiflexion (20 deg) 5 6 5/5 3 4 5/5     Inversion  (Ankle, not subtalar) 37 40 5/5 37 40 5/5 Hypermobile Bilaterally   Eversion  (Ankle, not subtalar) 20 21 5/5 20 25 4+/5 Hypermobile Bilaterally      DL heel raise assessment = calcaneal eversion   SL heel raise assessment = 16 on right, 5 on left -not retested      Special tests:  Anterior Drawer Test = slight laxity on left, firm end feel Bilateral        Limitation/Restriction for FOTO Ankle Survey     Therapist reviewed FOTO scores for Shikha Lassiter on 2/1/23   FOTO documents entered into [employer] - see Media section.     Limitation Score: 36% initial-->28%  Predicted: 20%     Treatment     Shikha received the treatments listed below:      therapeutic exercises to develop strength, endurance, ROM, flexibility, and posture for 45 minutes including:  ankle eversion with red theraband 3x10   Seated soleus heel raises with 30# 3x10  ankle inversion with theraband 3x10   Double leg press on shuttle 20x 2 cords top (increase next)  Single leg heel raises on shuttle 2x10 2 cords top  Standing hip abduction red theraband 3x10  Single leg balance on airex 3x30"  Tandem balance 1/2 foam flat side up 2x30"    manual therapy techniques: Joint mobilizations were applied to the: left ankle for 8 minutes, including:  Talocrural distraction grade IV  A/p mobilization grade III-IV     Patient Education and Home Exercises     Home Exercises Provided and Patient Education Provided     Education provided:   - continue home exercise program  -load management    Written Home Exercises Provided: Patient instructed to cont prior HEP. Exercises were reviewed and Shikha was able to demonstrate them prior to the end of the session.  Shikha demonstrated " good  understanding of the education provided. See EMR under Patient Instructions for exercises provided during therapy sessions    ASSESSMENT   Shikha is a 32 y.o. female referred to outpatient Physical Therapy with a medical diagnosis of Gastrocnemius equinus, Hindfoot pronation, and Peroneal tendinitis presenting to PT at [facility]. Pt returns following break from PT due to schedule availability. Since prior visit, pt compliant with home exercise program and changed shoes and has improvement in pain. She demonstrates mild improvements in range of motion and moderate improvements in strength and overall decreased tissue irritability. Pt tolerated today's session well. She will continue to benefit from further strengthening, balance, and proprioceptive training to improve functional mobility and tolerance for standing at work.       Shikha Is progressing well towards her goals.   Pt prognosis is Excellent.     Pt will continue to benefit from skilled outpatient physical therapy to address the deficits listed in the problem list box on initial evaluation, provide pt/family education and to maximize pt's level of independence in the home and community environment.     Pt's spiritual, cultural and educational needs considered and pt agreeable to plan of care and goals.     Anticipated barriers to physical therapy: none    Goals:    Short Term Goals:  4 weeks  1. Report decreased left ankle pain </= 2/10 to increase tolerance for ADLs. Progressing, not met  2. Increase left ankle AROM dorsiflexion to 5 degrees in order to walk with min to no compensation. Progressing, not met  3. Pt will demo good sitting and standing posture for improved spine and joint alignment for improved biomechanics. Progressing, not met  4. Pt to tolerate HEP to improve ROM and independence with ADL's.Progressing, not met     Long Term Goals: 8 weeks  1. Report decreased left ankle pain </= 0/10 with squatting and  double leg hopping to increase tolerance for increased QoL and improved ADLs.Progressing, not met  2. Patient goal: to no longer have pain.Progressing, not met  3. Increase strength to >/= 5/5 for BLE to increase tolerance for ADL and work activities.Progressing, not met  4. Pt will report at </= 20% impaired on ANKLE FOTO score to demo increased functional mobility. Progressing, not met  5. Pt will be able to ambulate community distances and negotiate stairs with minimal ankle pain for increased functional mobility and QoL.Progressing, not met       PLAN   Plan of care Certification: 12/20/2022 to 2/17/23.  Ankle strength, balance, and proprioception.     BRENT SAUNDERS, PT

## 2023-02-08 ENCOUNTER — CLINICAL SUPPORT (OUTPATIENT)
Dept: REHABILITATION | Facility: HOSPITAL | Age: 33
End: 2023-02-08
Payer: COMMERCIAL

## 2023-02-08 DIAGNOSIS — R53.1 DECREASED STRENGTH: Primary | ICD-10-CM

## 2023-02-08 PROCEDURE — 97140 MANUAL THERAPY 1/> REGIONS: CPT | Mod: PN

## 2023-02-08 PROCEDURE — 97110 THERAPEUTIC EXERCISES: CPT | Mod: PN

## 2023-02-08 NOTE — PROGRESS NOTES
"[facility]   Physical Therapy Treatment Note    Name: Shikha Lassiter  Clinic Number: [id number]    Therapy Diagnosis:   Encounter Diagnosis   Name Primary?    Decreased strength Yes         Physician: Dianne Wilkins MD    Visit Date: 2/8/2023  Physician Orders: PT Eval and Treat  Medical Diagnosis from Referral:   M76.72 (ICD-10-CM) - Peroneal tendinitis, left   M21.6X2 (ICD-10-CM) - Hindfoot pronation, left   M21.862 (ICD-10-CM) - Gastrocnemius equinus, left      Evaluation Date: 12/20/2022  Authorization Period Expiration: 12/31/2022  Plan of Care Expiration: 2/17/23  Progress Note Due: 3/1/23  Visit # / Visits authorized: 2/20   FOTO: 4/5  PTA Visit #: 0/5   Precautions: Standard   Prefers Female only     Time In: 5:07  Time Out: 6:00  Total Billable Time: 53 minutes    SUBJECTIVE     Pt reports: she started online working outs with jumping and lateral movements. She has pain without shoes but feels better with shoes during exercise. She wore flats today which caused some discomfort at work.   She was compliant with home exercise program.  Response to previous treatment: no soreness  Functional change: ongoing    Pain: 1-2/10  Location: distal to left lateral malleolus      OBJECTIVE     Objective Measures updated at progress report unless specified.     Treatment     Shikha received the treatments listed below:      therapeutic exercises to develop strength, endurance, ROM, flexibility, and posture for 45 minutes including:  ankle eversion with green theraband 3x10 30 sec hold 10th rep  Seated soleus heel raises with 30# 3x12 L1   ankle inversion with green theraband 3x10   Double leg press on shuttle 20x 2.5 cords top   Single leg heel raises on shuttle 2x10 2 cords top  Standing hip abduction red theraband 3x10  Single leg balance on airex 3x30"  Tandem balance 1/2 foam flat side up 2x30"  Side stepping red theraband feet 3 laps    manual therapy techniques: Joint mobilizations were " applied to the: left ankle for 8 minutes, including:  Talocrural distraction grade IV  A/p mobilization grade III-IV     Patient Education and Home Exercises     Home Exercises Provided and Patient Education Provided     Education provided:   - continue home exercise program  -load management    Written Home Exercises Provided: Patient instructed to cont prior HEP. Exercises were reviewed and Shikha was able to demonstrate them prior to the end of the session.  Shikha demonstrated good  understanding of the education provided. See EMR under Patient Instructions for exercises provided during therapy sessions    ASSESSMENT   Shikha is a 32 y.o. female referred to outpatient Physical Therapy with a medical diagnosis of Gastrocnemius equinus, Hindfoot pronation, and Peroneal tendinitis presenting to PT at [facility]. Pt presents with some increased soreness today secondary to beginning to workouts involving jumping and lateral movements and wearing non-supportive shoes today. Despite soreness pt tolerated today's session well and performed progressions without increased pain. Ongoing tenderness to palpation of peroneal tendons.     Shikha Is progressing well towards her goals.   Pt prognosis is Excellent.     Pt will continue to benefit from skilled outpatient physical therapy to address the deficits listed in the problem list box on initial evaluation, provide pt/family education and to maximize pt's level of independence in the home and community environment.     Pt's spiritual, cultural and educational needs considered and pt agreeable to plan of care and goals.     Anticipated barriers to physical therapy: none    Goals:    Short Term Goals:  4 weeks  1. Report decreased left ankle pain </= 2/10 to increase tolerance for ADLs. Progressing, not met  2. Increase left ankle AROM dorsiflexion to 5 degrees in order to walk with min to no compensation. Progressing, not met  3. Pt will demo good  sitting and standing posture for improved spine and joint alignment for improved biomechanics. Progressing, not met  4. Pt to tolerate HEP to improve ROM and independence with ADL's.Progressing, not met     Long Term Goals: 8 weeks  1. Report decreased left ankle pain </= 0/10 with squatting and double leg hopping to increase tolerance for increased QoL and improved ADLs.Progressing, not met  2. Patient goal: to no longer have pain.Progressing, not met  3. Increase strength to >/= 5/5 for BLE to increase tolerance for ADL and work activities.Progressing, not met  4. Pt will report at </= 20% impaired on ANKLE FOTO score to demo increased functional mobility. Progressing, not met  5. Pt will be able to ambulate community distances and negotiate stairs with minimal ankle pain for increased functional mobility and QoL.Progressing, not met       PLAN   Plan of care Certification: 12/20/2022 to 2/17/23.  Ankle strength, balance, and proprioception.     RBENT SAUNDERS, PT

## 2023-02-22 ENCOUNTER — CLINICAL SUPPORT (OUTPATIENT)
Dept: REHABILITATION | Facility: HOSPITAL | Age: 33
End: 2023-02-22
Payer: COMMERCIAL

## 2023-02-22 DIAGNOSIS — M62.462 GASTROCNEMIUS EQUINUS, LEFT: ICD-10-CM

## 2023-02-22 DIAGNOSIS — M21.6X2 HINDFOOT PRONATION, LEFT: ICD-10-CM

## 2023-02-22 DIAGNOSIS — M76.72 PERONEAL TENDINITIS, LEFT: ICD-10-CM

## 2023-02-22 DIAGNOSIS — R53.1 DECREASED STRENGTH: Primary | ICD-10-CM

## 2023-02-22 PROCEDURE — 97110 THERAPEUTIC EXERCISES: CPT | Mod: PN

## 2023-02-22 PROCEDURE — 97140 MANUAL THERAPY 1/> REGIONS: CPT | Mod: PN

## 2023-02-22 NOTE — PROGRESS NOTES
[facility]   Physical Therapy Treatment Note    Name: Shikha Lassiter  Clinic Number: [id number]    Therapy Diagnosis:   Encounter Diagnoses   Name Primary?    Decreased strength Yes    Peroneal tendinitis, left     Hindfoot pronation, left     Gastrocnemius equinus, left        Physician: Dianne Wilkins MD    Visit Date: 2/22/2023  Physician Orders: PT Eval and Treat  Medical Diagnosis from Referral:   M76.72 (ICD-10-CM) - Peroneal tendinitis, left   M21.6X2 (ICD-10-CM) - Hindfoot pronation, left   M21.862 (ICD-10-CM) - Gastrocnemius equinus, left      Evaluation Date: 12/20/2022  Authorization Period Expiration: 12/31/2022  Plan of Care Expiration: 3/24/23  Progress Note Due: 3/24/23  Visit # / Visits authorized: 3/20   FOTO: 5/5  PTA Visit #: 0/5   Precautions: Standard   Prefers Female only     Time In: 5:03  Time Out: 5:56  Total Billable Time: 53 minutes    SUBJECTIVE     Pt reports: she has been exercising more with jumping and lunges and able to tolerate wearing shoes. She was able to wear flats all day. States its getting better and stronger.   She was compliant with home exercise program.  Response to previous treatment: no soreness  Functional change: ongoing    Pain: 1-2/10  Location: distal to left lateral malleolus      OBJECTIVE     Posture: Bilateral mild pes planus with mild left lateral column compression  Sensation: WNL  Palpation: no tenderness at peroneal tendon at posterior malleolus     Ankle   Right     Left   Pain/Dysfunction with Movement     AROM PROM MMT AROM PROM MMT     Great toe (45/70 deg) -- -- NT -- -- NT     Plantarflexion (50 deg 50 50 4+/5 50 50 4+/5     Dorsiflexion (20 deg) 6 7 5/5 5 6 5/5     Inversion  (Ankle, not subtalar) 40 40 5/5 37 40 5/5 Hypermobile Bilaterally   Eversion  (Ankle, not subtalar) 22 23 5/5 27 30 5/5 Hypermobile Bilaterally      DL heel raise assessment = calcaneal eversion   SL heel raise assessment = 16 on right, 7 on  "left    Limitation/Restriction for FOTO Ankle Survey     Therapist reviewed FOTO scores for Shikha Lassiter on 2/22/23   FOTO documents entered into [employer] - see Media section.     Limitation Score: 36% initial-->23%  Predicted: 20%     Treatment     Shikha received the treatments listed below:      therapeutic exercises to develop strength, endurance, ROM, flexibility, and posture for 45 minutes including:  ankle eversion with green theraband 3x10 30 sec hold 10th rep  Seated soleus heel raises with 30# 3x12 L1   ankle inversion with green theraband 3x10 seated fig 4  Double leg press on shuttle 20x 2.5 cords top   Single leg heel raises on shuttle 2x10 2 cords top  Standing hip abduction red theraband 3x10  Single leg balance on airex 3x30"  Tandem balance 1/2 foam flat side up 2x30"  Side stepping green theraband feet 3 laps  Bosu lunge 2x10  SLS trampoline ball toss 3 way 15x each    manual therapy techniques: Joint mobilizations were applied to the: left ankle for 8 minutes, including:  Talocrural distraction grade IV  A/p mobilization grade III-IV     Patient Education and Home Exercises     Home Exercises Provided and Patient Education Provided     Education provided:   - continue home exercise program  -load management    Written Home Exercises Provided: Patient instructed to cont prior HEP. Exercises were reviewed and Shikha was able to demonstrate them prior to the end of the session.  Shikha demonstrated good  understanding of the education provided. See EMR under Patient Instructions for exercises provided during therapy sessions    ASSESSMENT   Shikha is a 32 y.o. female referred to outpatient Physical Therapy with a medical diagnosis of Gastrocnemius equinus, Hindfoot pronation, and Peroneal tendinitis presenting to PT at [facility]. Pt has been to 5 visits to date with moderate progress towards goals. She has decreased tenderness to touch at peroneal tendons and decreased tissue " irritability with manual muscle testing. Gradual progressions of strength and ankle range of motion. She continues to have decreased calf strength and balance impairments and will continue to benefit from skilled PT to address remaining deficits to work towards improving tolerance for standing and walking and recreational fitness.     Shikha Is progressing well towards her goals.   Pt prognosis is Excellent.     Pt will continue to benefit from skilled outpatient physical therapy to address the deficits listed in the problem list box on initial evaluation, provide pt/family education and to maximize pt's level of independence in the home and community environment.     Pt's spiritual, cultural and educational needs considered and pt agreeable to plan of care and goals.     Anticipated barriers to physical therapy: none    Goals:    Short Term Goals:  4 weeks  1. Report decreased left ankle pain </= 2/10 to increase tolerance for ADLs. Met  2. Increase left ankle AROM dorsiflexion to 5 degrees in order to walk with min to no compensation. Met  3. Pt will demo good sitting and standing posture for improved spine and joint alignment for improved biomechanics. Progressing, not met  4. Pt to tolerate HEP to improve ROM and independence with ADL's.Progressing, not met     Long Term Goals: 8 weeks  1. Report decreased left ankle pain </= 0/10 with squatting and double leg hopping to increase tolerance for increased QoL and improved ADLs.Progressing, not met  2. Patient goal: to no longer have pain.Progressing, not met  3. Increase strength to >/= 5/5 for BLE to increase tolerance for ADL and work activities.Progressing, not met  4. Pt will report at </= 20% impaired on ANKLE FOTO score to demo increased functional mobility. Progressing, not met  5. Pt will be able to ambulate community distances and negotiate stairs with minimal ankle pain for increased functional mobility and QoL.Progressing, not met       PLAN   Plan of  care Certification: 2/22/23-3/24/23  Ankle strength, balance, and proprioception.     BRENT SAUNDERS, PT

## 2023-02-23 NOTE — PLAN OF CARE
[facility]   Physical Therapy plan of care     Name: Shikha Lassiter  Clinic Number: [id number]    Therapy Diagnosis:   Encounter Diagnoses   Name Primary?    Decreased strength Yes    Peroneal tendinitis, left     Hindfoot pronation, left     Gastrocnemius equinus, left        Physician: Dianne Wilkins MD    Visit Date: 2/22/2023  Physician Orders: PT Eval and Treat  Medical Diagnosis from Referral:   M76.72 (ICD-10-CM) - Peroneal tendinitis, left   M21.6X2 (ICD-10-CM) - Hindfoot pronation, left   M21.862 (ICD-10-CM) - Gastrocnemius equinus, left      Evaluation Date: 12/20/2022  Authorization Period Expiration: 12/31/2022  Plan of Care Expiration: 3/24/23  Progress Note Due: 3/24/23  Visit # / Visits authorized: 3/20   FOTO: 5/5  PTA Visit #: 0/5   Precautions: Standard   Prefers Female only     Time In: 5:03  Time Out: 5:56  Total Billable Time: 53 minutes    SUBJECTIVE     Pt reports: she has been exercising more with jumping and lunges and able to tolerate wearing shoes. She was able to wear flats all day. States its getting better and stronger.   She was compliant with home exercise program.  Response to previous treatment: no soreness  Functional change: ongoing    Pain: 1-2/10  Location: distal to left lateral malleolus      OBJECTIVE     Posture: Bilateral mild pes planus with mild left lateral column compression  Sensation: WNL  Palpation: no tenderness at peroneal tendon at posterior malleolus     Ankle   Right     Left   Pain/Dysfunction with Movement     AROM PROM MMT AROM PROM MMT     Great toe (45/70 deg) -- -- NT -- -- NT     Plantarflexion (50 deg 50 50 4+/5 50 50 4+/5     Dorsiflexion (20 deg) 6 7 5/5 5 6 5/5     Inversion  (Ankle, not subtalar) 40 40 5/5 37 40 5/5 Hypermobile Bilaterally   Eversion  (Ankle, not subtalar) 22 23 5/5 27 30 5/5 Hypermobile Bilaterally      DL heel raise assessment = calcaneal eversion   SL heel raise assessment = 16 on right, 7 on  left    Limitation/Restriction for FOTO Ankle Survey     Therapist reviewed FOTO scores for Shikha Lassiter on 2/22/23   FOTO documents entered into [employer] - see Media section.     Limitation Score: 36% initial-->23%  Predicted: 20%     Treatment     Shikha received the treatments listed in daily note    Patient Education and Home Exercises     Home Exercises Provided and Patient Education Provided     Education provided:   - continue home exercise program  -load management    Written Home Exercises Provided: Patient instructed to cont prior HEP. Exercises were reviewed and Shikha was able to demonstrate them prior to the end of the session.  Shikha demonstrated good  understanding of the education provided. See EMR under Patient Instructions for exercises provided during therapy sessions    ASSESSMENT   Shikha is a 32 y.o. female referred to outpatient Physical Therapy with a medical diagnosis of Gastrocnemius equinus, Hindfoot pronation, and Peroneal tendinitis presenting to PT at [facility]. Pt has been to 5 visits to date with moderate progress towards goals. She has decreased tenderness to touch at peroneal tendons and decreased tissue irritability with manual muscle testing. Gradual progressions of strength and ankle range of motion. She continues to have decreased calf strength and balance impairments and will continue to benefit from skilled PT to address remaining deficits to work towards improving tolerance for standing and walking and recreational fitness.     Shikha Is progressing well towards her goals.   Pt prognosis is Excellent.     Pt will continue to benefit from skilled outpatient physical therapy to address the deficits listed in the problem list box on initial evaluation, provide pt/family education and to maximize pt's level of independence in the home and community environment.     Pt's spiritual, cultural and educational needs considered and pt agreeable to plan of care and  goals.     Anticipated barriers to physical therapy: none    Goals:    Short Term Goals:  4 weeks  1. Report decreased left ankle pain </= 2/10 to increase tolerance for ADLs. Met  2. Increase left ankle AROM dorsiflexion to 5 degrees in order to walk with min to no compensation. Met  3. Pt will demo good sitting and standing posture for improved spine and joint alignment for improved biomechanics. Progressing, not met  4. Pt to tolerate HEP to improve ROM and independence with ADL's.Progressing, not met     Long Term Goals: 8 weeks  1. Report decreased left ankle pain </= 0/10 with squatting and double leg hopping to increase tolerance for increased QoL and improved ADLs.Progressing, not met  2. Patient goal: to no longer have pain.Progressing, not met  3. Increase strength to >/= 5/5 for BLE to increase tolerance for ADL and work activities.Progressing, not met  4. Pt will report at </= 20% impaired on ANKLE FOTO score to demo increased functional mobility. Progressing, not met  5. Pt will be able to ambulate community distances and negotiate stairs with minimal ankle pain for increased functional mobility and QoL.Progressing, not met       PLAN   Plan of care Certification: 2/22/23-3/24/23  Ankle strength, balance, and proprioception.     BRENT SAUNDERS, PT

## 2023-03-08 ENCOUNTER — CLINICAL SUPPORT (OUTPATIENT)
Dept: REHABILITATION | Facility: HOSPITAL | Age: 33
End: 2023-03-08
Payer: COMMERCIAL

## 2023-03-08 DIAGNOSIS — R53.1 DECREASED STRENGTH: Primary | ICD-10-CM

## 2023-03-08 PROCEDURE — 97140 MANUAL THERAPY 1/> REGIONS: CPT | Mod: PN,CQ

## 2023-03-08 PROCEDURE — 97110 THERAPEUTIC EXERCISES: CPT | Mod: PN,CQ

## 2023-03-08 NOTE — PROGRESS NOTES
"[facility]   Physical Therapy Treatment Note    Name: Shikha Lassiter  Clinic Number: [id number]    Therapy Diagnosis:   No diagnosis found.      Physician: Dianne Wilkins MD    Visit Date: 3/8/2023  Physician Orders: PT Eval and Treat  Medical Diagnosis from Referral:   M76.72 (ICD-10-CM) - Peroneal tendinitis, left   M21.6X2 (ICD-10-CM) - Hindfoot pronation, left   M21.862 (ICD-10-CM) - Gastrocnemius equinus, left      Evaluation Date: 12/20/2022  Authorization Period Expiration: 12/31/2022  Plan of Care Expiration: 3/24/23  Progress Note Due: 3/24/23  Visit # / Visits authorized: 4/20   FOTO: 5/5  PTA Visit #: 1/5   Precautions: Standard   Prefers Female only     Time In: 5:29 pm  Time Out: 6:02 pm  Total Billable Time: 33 minutes    SUBJECTIVE     Pt reports: no changes since last visit. Pt stated that her pain fluctuates randomly.   She was compliant with home exercise program.  Response to previous treatment: no soreness  Functional change: ongoing    Pain: 1-2/10  Location: distal to left lateral malleolus      OBJECTIVE     See last note for updated objective measures     Treatment     Shikha received the treatments listed below:      therapeutic exercises to develop strength, endurance, ROM, flexibility, and posture for 22 minutes including:  ankle eversion with green theraband 3x10 30 sec hold 10th rep  Seated soleus heel raises with 30# 3x12 L1   ankle inversion with green theraband 3x10 seated fig 4  Double leg press on shuttle 20x 2.5 cords top  -> OOT  Single leg heel raises on shuttle 2x10 2 cords top -> OOT  Standing hip abduction red theraband 3x10 -> OOT  Single leg balance on airex 3x30"  Tandem balance 1/2 foam flat side up 2x30" -> OOT  Side stepping green theraband feet 3 laps -> OOT  Bosu lunge 2x10 5" hold  SLS trampoline ball toss 3 way 15x each -> OOT  Fitter board balance 2' ea direction in // bars    manual therapy techniques: Joint mobilizations were applied to the: " left ankle for 11 minutes, including:  Talocrural distraction grade II-III  A/p mobilization grade II-III    Patient Education and Home Exercises     Home Exercises Provided and Patient Education Provided     Education provided:   -continue home exercise program  -load management    Written Home Exercises Provided: Patient instructed to cont prior HEP. Exercises were reviewed and Shikha was able to demonstrate them prior to the end of the session.  Shikha demonstrated good  understanding of the education provided. See EMR under Patient Instructions for exercises provided during therapy sessions    ASSESSMENT     Pt w/ good willima of instructed exercises today as progressions were made in tx. Pt w/ no c/o increased pain throughout tx. Pt required min verbal cues throughout tx to complete exercises w/ proper technique. Pt w/ good response to MT today. Pt unable to complete full tx 2/2 arriving late to appt.     Shikha Is progressing well towards her goals.   Pt prognosis is Excellent.     Pt will continue to benefit from skilled outpatient physical therapy to address the deficits listed in the problem list box on initial evaluation, provide pt/family education and to maximize pt's level of independence in the home and community environment.     Pt's spiritual, cultural and educational needs considered and pt agreeable to plan of care and goals.     Anticipated barriers to physical therapy: none    Goals:    Short Term Goals:  4 weeks  1. Report decreased left ankle pain </= 2/10 to increase tolerance for ADLs. Met  2. Increase left ankle AROM dorsiflexion to 5 degrees in order to walk with min to no compensation. Met  3. Pt will demo good sitting and standing posture for improved spine and joint alignment for improved biomechanics. Progressing, not met  4. Pt to tolerate HEP to improve ROM and independence with ADL's.Progressing, not met     Long Term Goals: 8 weeks  1. Report decreased left ankle pain </= 0/10 with  squatting and double leg hopping to increase tolerance for increased QoL and improved ADLs.Progressing, not met  2. Patient goal: to no longer have pain.Progressing, not met  3. Increase strength to >/= 5/5 for BLE to increase tolerance for ADL and work activities.Progressing, not met  4. Pt will report at </= 20% impaired on ANKLE FOTO score to demo increased functional mobility. Progressing, not met  5. Pt will be able to ambulate community distances and negotiate stairs with minimal ankle pain for increased functional mobility and QoL.Progressing, not met       PLAN   Plan of care Certification: 2/22/23-3/24/23  Ankle strength, balance, and proprioception.     Argenis Vernon, PTA

## 2023-03-14 NOTE — PROGRESS NOTES
"[facility]   Physical Therapy Treatment Note    Name: Shikha Lassiter  Clinic Number: [id number]    Therapy Diagnosis:   Encounter Diagnosis   Name Primary?    Decreased strength Yes       Physician: Dianne Wilkins MD    Visit Date: 3/15/2023  Physician Orders: PT Eval and Treat  Medical Diagnosis from Referral:   M76.72 (ICD-10-CM) - Peroneal tendinitis, left   M21.6X2 (ICD-10-CM) - Hindfoot pronation, left   M21.862 (ICD-10-CM) - Gastrocnemius equinus, left      Evaluation Date: 12/20/2022  Authorization Period Expiration: 12/31/2022  Plan of Care Expiration: 3/24/23  Progress Note Due: 3/24/23  Visit # / Visits authorized: 4/20   FOTO: 5/5  PTA Visit #: 1/5   Precautions: Standard   Prefers Female only     Time In: 5:15 pm  Time Out: 6:00 pm  Total Billable Time: 45 minutes    SUBJECTIVE     Pt reports: no changes since last visit and no elevated pain pre tx.   She was compliant with home exercise program.  Response to previous treatment: no soreness  Functional change: ongoing    Pain: 1-2/10  Location: distal to left lateral malleolus      OBJECTIVE     See last note for updated objective measures     Treatment     Shikha received the treatments listed below:      therapeutic exercises to develop strength, endurance, ROM, flexibility, and posture for 30 minutes including:  ankle eversion with green theraband 3x10 30 sec hold 10th rep  Seated soleus heel raises with 30# 3x12 L1   ankle inversion with green theraband 3x10 seated fig 4  Double leg press on shuttle 20x 2.5 cords top  -> OOT  Single leg heel raises on shuttle 2x10 2 cords top   Standing hip abduction red theraband 3x10 -> OOT  Single leg balance on airex 3x30" -> OOT  Tandem balance 1/2 foam flat side up 2x30" -> OOT  Side stepping green theraband feet 3 laps   Bosu lunge 2x10 5" hold  SLS trampoline ball toss 3 way 15x each   Fitter board balance 2' ea direction in // bars    manual therapy techniques: Joint mobilizations were " applied to the: left ankle for 15 minutes, including:  Talocrural distraction grade II-III  A/p mobilization grade II-III    Patient Education and Home Exercises     Home Exercises Provided and Patient Education Provided     Education provided:   -continue home exercise program  -load management    Written Home Exercises Provided: Patient instructed to cont prior HEP. Exercises were reviewed and Shikha was able to demonstrate them prior to the end of the session.  Shikha demonstrated good  understanding of the education provided. See EMR under Patient Instructions for exercises provided during therapy sessions    ASSESSMENT     Pt w/ good william of instructed exercises today as progressions were made in tx. Pt w/ no c/o increased pain throughout tx. Pt required min verbal cues throughout tx to complete exercises w/ proper technique. Pt w/ good response to MT today. Pt unable to complete full tx 2/2 arriving late to appt.     Shikha Is progressing well towards her goals.   Pt prognosis is Excellent.     Pt will continue to benefit from skilled outpatient physical therapy to address the deficits listed in the problem list box on initial evaluation, provide pt/family education and to maximize pt's level of independence in the home and community environment.     Pt's spiritual, cultural and educational needs considered and pt agreeable to plan of care and goals.     Anticipated barriers to physical therapy: none    Goals:    Short Term Goals:  4 weeks  1. Report decreased left ankle pain </= 2/10 to increase tolerance for ADLs. Met  2. Increase left ankle AROM dorsiflexion to 5 degrees in order to walk with min to no compensation. Met  3. Pt will demo good sitting and standing posture for improved spine and joint alignment for improved biomechanics. Progressing, not met  4. Pt to tolerate HEP to improve ROM and independence with ADL's.Progressing, not met     Long Term Goals: 8 weeks  1. Report decreased left ankle pain </=  0/10 with squatting and double leg hopping to increase tolerance for increased QoL and improved ADLs.Progressing, not met  2. Patient goal: to no longer have pain.Progressing, not met  3. Increase strength to >/= 5/5 for BLE to increase tolerance for ADL and work activities.Progressing, not met  4. Pt will report at </= 20% impaired on ANKLE FOTO score to demo increased functional mobility. Progressing, not met  5. Pt will be able to ambulate community distances and negotiate stairs with minimal ankle pain for increased functional mobility and QoL.Progressing, not met       PLAN   Plan of care Certification: 2/22/23-3/24/23  Ankle strength, balance, and proprioception.     Argenis Vernon, PTA

## 2023-03-15 ENCOUNTER — CLINICAL SUPPORT (OUTPATIENT)
Dept: REHABILITATION | Facility: HOSPITAL | Age: 33
End: 2023-03-15
Payer: COMMERCIAL

## 2023-03-15 DIAGNOSIS — R53.1 DECREASED STRENGTH: Primary | ICD-10-CM

## 2023-03-15 PROCEDURE — 97110 THERAPEUTIC EXERCISES: CPT | Mod: PN,CQ

## 2023-04-05 ENCOUNTER — DOCUMENTATION ONLY (OUTPATIENT)
Dept: REHABILITATION | Facility: HOSPITAL | Age: 33
End: 2023-04-05
Payer: COMMERCIAL

## 2023-04-05 NOTE — PROGRESS NOTES
Physical therapist and physical therapy assistant(s) met face to face to discuss patient's treatment plan and progress towards established goals. Pt will be seen by a physical therapist minimally every 6th visit or every 30 days.    BRENT SAUNDERS, PT, DPT

## 2024-03-05 ENCOUNTER — OFFICE VISIT (OUTPATIENT)
Dept: OPHTHALMOLOGY | Facility: CLINIC | Age: 34
End: 2024-03-05
Payer: COMMERCIAL

## 2024-03-05 DIAGNOSIS — H18.623 KERATOCONUS, UNSTABLE, BILATERAL: Primary | ICD-10-CM

## 2024-03-05 PROCEDURE — 1159F MED LIST DOCD IN RCRD: CPT | Mod: CPTII,S$GLB,, | Performed by: OPHTHALMOLOGY

## 2024-03-05 PROCEDURE — 1160F RVW MEDS BY RX/DR IN RCRD: CPT | Mod: CPTII,S$GLB,, | Performed by: OPHTHALMOLOGY

## 2024-03-05 PROCEDURE — 92004 COMPRE OPH EXAM NEW PT 1/>: CPT | Mod: S$GLB,,, | Performed by: OPHTHALMOLOGY

## 2024-03-05 PROCEDURE — 99999 PR PBB SHADOW E&M-EST. PATIENT-LVL III: CPT | Mod: PBBFAC,,, | Performed by: OPHTHALMOLOGY

## 2024-03-05 RX ORDER — PANTOPRAZOLE SODIUM 40 MG/1
40 TABLET, DELAYED RELEASE ORAL
COMMUNITY

## 2024-03-05 RX ORDER — DIAZEPAM 5 MG/1
5 TABLET ORAL
Qty: 5 TABLET | Refills: 0 | Status: SHIPPED | OUTPATIENT
Start: 2024-03-05 | End: 2024-03-15

## 2024-03-05 RX ORDER — LOSARTAN POTASSIUM 25 MG/1
1 TABLET ORAL NIGHTLY
COMMUNITY
Start: 2023-06-30 | End: 2024-06-29

## 2024-03-05 RX ORDER — PREDNISOLONE ACETATE 10 MG/ML
1 SUSPENSION/ DROPS OPHTHALMIC 4 TIMES DAILY
Qty: 10 ML | Refills: 4 | Status: SHIPPED | OUTPATIENT
Start: 2024-03-05

## 2024-03-05 RX ORDER — MOXIFLOXACIN 5 MG/ML
1 SOLUTION/ DROPS OPHTHALMIC 4 TIMES DAILY
Qty: 3 ML | Refills: 3 | Status: SHIPPED | OUTPATIENT
Start: 2024-03-05

## 2024-03-05 NOTE — PROGRESS NOTES
HPI    Patient is here today for a Keratoconus evaluation. States vision is   blurry OU. States she is being fitted for scleral lenses right now with   Dr. Mercado. No pain or discomfort.   Last edited by Francie Luu on 3/5/2024 10:02 AM.            Assessment /Plan     For exam results, see Encounter Report.    Keratoconus, unstable, bilateral  -     Collagen Cross-Linking; Future    Other orders  -     riboflavin 5-phosphate sod(B2) 0.146 % Drop 6 mL  -     prednisoLONE acetate (PRED FORTE) 1 % DrpS; Place 1 drop into the left eye 4 (four) times daily.  Dispense: 10 mL; Refill: 4  -     moxifloxacin (VIGAMOX) 0.5 % ophthalmic solution; Place 1 drop into the left eye 4 (four) times daily.  Dispense: 3 mL; Refill: 3  -     diazePAM (VALIUM) 5 MG tablet; Take 1 tablet (5 mg total) by mouth as needed for Anxiety.  Dispense: 5 tablet; Refill: 0        The diagnosis of keratoconus and its etiology and clinical course were discussed.  Treatment with the use of specialty contact lenses, collagen cross linking, and corneal transplantation was explained in detail. This patient has never had LASIK.    This patient exhibits signs of progression of keratoconus and failure of conservative treatments with spectacles and/or contact lenses.   Disease progression is indicated by   - An increase of >1D in the Kmax  - An increase of >1D of astigmatism in the MRx  - An increase in myopia of >0.50D on MRx    Marked progression from lu 2017 OU    I recommend treatment with Collagen Crosslinking using the Avedro FDA approved epi-off protocol with Photrexa and the KXL System, in order to stabilize this condition.    Plan:   KXL treatment OS then OD    CCT: 485//413    K max: 54.1//76.9    I have informed the patient that we will seek insurance pre-approval, but in case of failure of the insurance company to cover the cost of this medically necessary procedure, there may be a cost of $5,000 for the patient.

## 2024-06-10 ENCOUNTER — TELEPHONE (OUTPATIENT)
Dept: OPHTHALMOLOGY | Facility: CLINIC | Age: 34
End: 2024-06-10
Payer: COMMERCIAL

## 2024-06-10 NOTE — TELEPHONE ENCOUNTER
----- Message from Evy Becerra sent at 6/10/2024  1:48 PM CDT -----  Contact: [phone]  Type:  Sooner Apoointment Request  Caller is requesting a sooner appointment.  Name of Caller:Lilian  When is the first available appointment?6/20/2024  Symptoms:Crosslinking  Would the patient rather a call back or a response via [facility]? Call  Best Call Back Number:[phone]  Additional Information:

## 2024-06-11 ENCOUNTER — TELEPHONE (OUTPATIENT)
Dept: OPHTHALMOLOGY | Facility: CLINIC | Age: 34
End: 2024-06-11
Payer: COMMERCIAL

## 2024-06-12 RX ORDER — DIAZEPAM 5 MG/1
5 TABLET ORAL
Qty: 5 TABLET | Refills: 0 | Status: SHIPPED | OUTPATIENT
Start: 2024-06-12 | End: 2024-06-22

## 2024-06-14 ENCOUNTER — PATIENT MESSAGE (OUTPATIENT)
Dept: OPHTHALMOLOGY | Facility: CLINIC | Age: 34
End: 2024-06-14
Payer: COMMERCIAL

## 2024-06-17 ENCOUNTER — TELEPHONE (OUTPATIENT)
Dept: OPHTHALMOLOGY | Facility: CLINIC | Age: 34
End: 2024-06-17

## 2024-06-17 ENCOUNTER — CLINICAL SUPPORT (OUTPATIENT)
Dept: OPHTHALMOLOGY | Facility: CLINIC | Age: 34
End: 2024-06-17
Attending: OPHTHALMOLOGY
Payer: COMMERCIAL

## 2024-06-17 DIAGNOSIS — H18.623 KERATOCONUS, UNSTABLE, BILATERAL: ICD-10-CM

## 2024-06-17 PROCEDURE — 0402T COLGN CRS-LINK CRN&PACHYMTRY: CPT | Mod: RT,S$GLB,, | Performed by: OPHTHALMOLOGY

## 2024-06-17 PROCEDURE — 99999 PR PBB SHADOW E&M-EST. PATIENT-LVL II: CPT | Mod: PBBFAC,,,

## 2024-06-17 PROCEDURE — 99499 UNLISTED E&M SERVICE: CPT | Mod: S$GLB,,, | Performed by: OPHTHALMOLOGY

## 2024-06-17 NOTE — PROGRESS NOTES
HPI    DLS: 3/5/24    KCN OU    Patient here for OS CXL.   Last edited by Juliana Sethi MA on 6/17/2024 10:29 AM.            Assessment /Plan     For exam results, see Encounter Report.    Keratoconus, unstable, bilateral  -     Collagen Cross-Linking      SURGEON:  Ismael Miller M.D.    PREOPERATIVE DIAGNOSIS: Keratoconus    POSTOPERATIVE DIAGNOSIS:  keratoconus    PROCEDURES:    Collagen Crosslinking right    ANESTHESIA: Topical Tetracaine 0.5%    COMPLICATIONS:  None    ESTIMATED BLOOD LOSS: None    SPECIMENS: None    INDICATIONS:  The patient has a history a progressive corneal ectasia.  During the initial consultation, a thorough discussion regarding of the risks, benefits, and alternatives to this procedure was undertaken.  Risks were listed on the consent form and were reviewed with emphasis on the remote possibility of infection, inflammation, scarring, and progression of ectasia - all of which can potentially lead to loss of vision.  Common side effects from the procedure, including pain, dry eye, glare, and haloes, were also explained, as well as defining realistic expectations of stabilizing the disease but not decreasing the need for glasses or contact lenses.  The patient voices understanding of these risks and side effects and communicates realistic expectations from the procedure.     DESCRIPTION OF PROCEDURE:   The patient was brought into the LASER suite and positioned on the bed.  A central 9mm epithelial debridement was achieved with the Amoils epithelial scrubber, and the initial riboflavin drops administered. A 30 minute induction with drops every 2 minutes was followed by pachymetry. When corneal pachy is less than 400um, hypotonic riboflavin drops are used to thicken the cornea to a minimum of 400um. Next, a 30 min UV light treatment, centered on the cornea was delivered, and riboflavin drops were continued. Both 3ml vials (2 units) of Riboflavin were used or wasted upon completion of the  procedure. Antibiotics and a bandage contact lens were placed.  The patient was discharged with care instructions and a follow up appointment in the eye clinic.

## 2024-06-17 NOTE — TELEPHONE ENCOUNTER
----- Message from Demetrice Durán sent at 6/17/2024  3:57 PM CDT -----  Regarding: Extreme Pain  Patient called in regards to being in extreme pain after today's cross linking procedure.     Please call back to further assist- [phone]

## 2024-06-17 NOTE — TELEPHONE ENCOUNTER
Spoke with patient letting her know after crossl inking you will be in a lot of pain with this procedure

## 2024-06-21 ENCOUNTER — OFFICE VISIT (OUTPATIENT)
Dept: OPHTHALMOLOGY | Facility: CLINIC | Age: 34
End: 2024-06-21
Payer: COMMERCIAL

## 2024-06-21 DIAGNOSIS — H18.623 KERATOCONUS, UNSTABLE, BILATERAL: Primary | ICD-10-CM

## 2024-06-21 PROCEDURE — 99999 PR PBB SHADOW E&M-EST. PATIENT-LVL III: CPT | Mod: PBBFAC,,, | Performed by: OPHTHALMOLOGY

## 2024-06-21 RX ORDER — OFLOXACIN 3 MG/ML
SOLUTION/ DROPS OPHTHALMIC
COMMUNITY
Start: 2024-06-14

## 2024-06-21 RX ORDER — OXYCODONE AND ACETAMINOPHEN 7.5; 325 MG/1; MG/1
1 TABLET ORAL EVERY 4 HOURS PRN
Qty: 20 TABLET | Refills: 0 | Status: SHIPPED | OUTPATIENT
Start: 2024-06-21

## 2024-06-21 NOTE — PROGRESS NOTES
HPI    Patient is here today for 5 day CXL OS. Vision looks blurry. Rates the   pain a 3/10. Eyes are very sensitive to the light.    PF QID OS  Ofloxacin QID OS  Last edited by Francie Luu on 6/21/2024  9:22 AM.            Assessment /Plan     For exam results, see Encounter Report.    Keratoconus, unstable, bilateral      POW1 KXL OS    Epi healed completely.  No signs of infection.  Ok to d/c abx. Cont Pred bid for 1 month.  FU 1 month.

## 2024-07-16 ENCOUNTER — OFFICE VISIT (OUTPATIENT)
Dept: OPHTHALMOLOGY | Facility: CLINIC | Age: 34
End: 2024-07-16
Payer: COMMERCIAL

## 2024-07-16 DIAGNOSIS — H18.623 KERATOCONUS, UNSTABLE, BILATERAL: Primary | ICD-10-CM

## 2024-07-16 PROCEDURE — 92012 INTRM OPH EXAM EST PATIENT: CPT | Mod: S$GLB,,, | Performed by: OPHTHALMOLOGY

## 2024-07-16 PROCEDURE — 99999 PR PBB SHADOW E&M-EST. PATIENT-LVL III: CPT | Mod: PBBFAC,,, | Performed by: OPHTHALMOLOGY

## 2024-07-16 PROCEDURE — 1160F RVW MEDS BY RX/DR IN RCRD: CPT | Mod: CPTII,S$GLB,, | Performed by: OPHTHALMOLOGY

## 2024-07-16 PROCEDURE — 1159F MED LIST DOCD IN RCRD: CPT | Mod: CPTII,S$GLB,, | Performed by: OPHTHALMOLOGY

## 2024-07-16 NOTE — PROGRESS NOTES
HPI    Patient present today for  1 month CXL   Pt state no ocmplaints at this time   No complaints at this time      PF QID OS   Ofloxacin QID OS       Last edited by Nilda Guzman on 7/16/2024  2:15 PM.            Assessment /Plan     For exam results, see Encounter Report.    Keratoconus, unstable, bilateral      1 month s/p KXL Tx OS  Cornea healed with expected haze from Tx.  No complaints.      The diagnosis of keratoconus and its etiology and clinical course were discussed.  Treatment with the use of specialty contact lenses, collagen cross linking, and corneal transplantation was explained in detail. This patient has never had LASIK.    This patient exhibits signs of progression of keratoconus and failure of conservative treatments with spectacles and/or contact lenses.   Disease progression is indicated by   - An increase of >1D in the Kmax  - An increase of >1D of astigmatism in the MRx  - An increase in myopia of >0.50D on MRx    Marked progression from lu 2017 OU    I recommend treatment with Collagen Crosslinking using the Avedro FDA approved epi-off protocol with Photrexa and the KXL System, in order to stabilize this condition.    Plan:   KXL treatment OD    CCT: 485//413    K max: 54.1//76.9    I have informed the patient that we will seek insurance pre-approval, but in case of failure of the insurance company to cover the cost of this medically necessary procedure, there may be a cost of $5,000 for the patient.

## 2024-08-05 ENCOUNTER — CLINICAL SUPPORT (OUTPATIENT)
Dept: OPHTHALMOLOGY | Facility: CLINIC | Age: 34
End: 2024-08-05
Payer: COMMERCIAL

## 2024-08-05 DIAGNOSIS — H18.623 KERATOCONUS, UNSTABLE, BILATERAL: ICD-10-CM

## 2024-08-05 PROCEDURE — 0402T COLGN CRS-LINK CRN&PACHYMTRY: CPT | Mod: RT,S$GLB,, | Performed by: OPHTHALMOLOGY

## 2024-08-05 PROCEDURE — 99499 UNLISTED E&M SERVICE: CPT | Mod: S$GLB,,, | Performed by: OPHTHALMOLOGY

## 2024-08-05 PROCEDURE — 99999 PR PBB SHADOW E&M-EST. PATIENT-LVL III: CPT | Mod: PBBFAC,,,

## 2024-08-09 ENCOUNTER — OFFICE VISIT (OUTPATIENT)
Dept: OPHTHALMOLOGY | Facility: CLINIC | Age: 34
End: 2024-08-09
Payer: COMMERCIAL

## 2024-08-09 DIAGNOSIS — H18.623 KERATOCONUS, UNSTABLE, BILATERAL: Primary | ICD-10-CM

## 2024-08-09 PROCEDURE — 99999 PR PBB SHADOW E&M-EST. PATIENT-LVL III: CPT | Mod: PBBFAC,,, | Performed by: OPHTHALMOLOGY

## 2024-08-09 RX ORDER — PREDNISOLONE ACETATE 10 MG/ML
1 SUSPENSION/ DROPS OPHTHALMIC 4 TIMES DAILY
Qty: 10 ML | Refills: 3 | Status: SHIPPED | OUTPATIENT
Start: 2024-08-09

## 2024-08-30 ENCOUNTER — OFFICE VISIT (OUTPATIENT)
Dept: OPHTHALMOLOGY | Facility: CLINIC | Age: 34
End: 2024-08-30
Payer: COMMERCIAL

## 2024-08-30 DIAGNOSIS — H18.623 KERATOCONUS, UNSTABLE, BILATERAL: Primary | ICD-10-CM

## 2024-08-30 PROCEDURE — 99999 PR PBB SHADOW E&M-EST. PATIENT-LVL III: CPT | Mod: PBBFAC,,, | Performed by: OPHTHALMOLOGY

## 2024-08-30 NOTE — PROGRESS NOTES
HPI    S/p CXL OD 8/5/24    PF BID OD    Pt here for CXL OD f/u. Pt denies eye pain OD.     Last edited by Katherine Jaeger MA on 8/30/2024  9:50 AM.            Assessment /Plan     For exam results, see Encounter Report.    Keratoconus, unstable, bilateral      1 month s/p KXL Tx OD  Cornea healed with mild epi ridge, so tears frequently  No complaints.

## 2025-02-12 ENCOUNTER — OFFICE VISIT (OUTPATIENT)
Dept: INTERNAL MEDICINE | Facility: CLINIC | Age: 35
End: 2025-02-12
Payer: COMMERCIAL

## 2025-02-12 VITALS
HEART RATE: 72 BPM | OXYGEN SATURATION: 98 % | DIASTOLIC BLOOD PRESSURE: 82 MMHG | WEIGHT: 158.31 LBS | SYSTOLIC BLOOD PRESSURE: 120 MMHG | BODY MASS INDEX: 23.99 KG/M2 | HEIGHT: 68 IN

## 2025-02-12 DIAGNOSIS — Z76.89 ENCOUNTER TO ESTABLISH CARE: ICD-10-CM

## 2025-02-12 DIAGNOSIS — Q61.3 POLYCYSTIC KIDNEY DISEASE: Primary | ICD-10-CM

## 2025-02-12 PROCEDURE — 3008F BODY MASS INDEX DOCD: CPT | Mod: CPTII,S$GLB,, | Performed by: STUDENT IN AN ORGANIZED HEALTH CARE EDUCATION/TRAINING PROGRAM

## 2025-02-12 PROCEDURE — 1159F MED LIST DOCD IN RCRD: CPT | Mod: CPTII,S$GLB,, | Performed by: STUDENT IN AN ORGANIZED HEALTH CARE EDUCATION/TRAINING PROGRAM

## 2025-02-12 PROCEDURE — 3074F SYST BP LT 130 MM HG: CPT | Mod: CPTII,S$GLB,, | Performed by: STUDENT IN AN ORGANIZED HEALTH CARE EDUCATION/TRAINING PROGRAM

## 2025-02-12 PROCEDURE — 99999 PR PBB SHADOW E&M-EST. PATIENT-LVL IV: CPT | Mod: PBBFAC,,, | Performed by: STUDENT IN AN ORGANIZED HEALTH CARE EDUCATION/TRAINING PROGRAM

## 2025-02-12 PROCEDURE — 3079F DIAST BP 80-89 MM HG: CPT | Mod: CPTII,S$GLB,, | Performed by: STUDENT IN AN ORGANIZED HEALTH CARE EDUCATION/TRAINING PROGRAM

## 2025-02-12 PROCEDURE — 99203 OFFICE O/P NEW LOW 30 MIN: CPT | Mod: S$GLB,,, | Performed by: STUDENT IN AN ORGANIZED HEALTH CARE EDUCATION/TRAINING PROGRAM

## 2025-02-12 PROCEDURE — 1160F RVW MEDS BY RX/DR IN RCRD: CPT | Mod: CPTII,S$GLB,, | Performed by: STUDENT IN AN ORGANIZED HEALTH CARE EDUCATION/TRAINING PROGRAM

## 2025-02-12 NOTE — PROGRESS NOTES
Patient ID: Shikha Lassiter is a 34 y.o. female.    Chief Complaint: Establish Care       Subjective:   History of Present Illness    CHIEF COMPLAINT:  Patient presents today to establish care with concerns about blood pressure.    HISTORY OF PRESENT ILLNESS:  She has experienced weight gain of 15 lbs over the last 2 years with an unusual distribution pattern. While weight typically accumulates in her upper body, she now notes enlargement of her previously thin calves. She also reports transient foot swelling during travel, particularly on flights, which resolves within hours after ambulation.    POLYCYSTIC KIDNEY DISEASE:  She was diagnosed with polycystic kidney disease 1.5 years ago as an incidental finding during a CT performed for a persistent cough. CT also revealed liver cysts. Brain MRA screening for aneurysms was negative. She follows up with Dr. Christianne Jamison at [facility] for management.    MEDICAL HISTORY:  She has keratoconus, which required collagen cross-linking surgery in both eyes in June and July to prevent vision loss and avoid cornea transplant.    FAMILY HISTORY:  Father has polycystic kidney disease status post kidney transplant, hypertension, diabetes, history of stroke, and carotidectomy. Brother has polycystic kidney disease with recent cyst rupture. Mother has papillary thyroid cancer diagnosed 2 years ago.    SOCIAL HISTORY:  She works as a dentist.    IMMUNIZATIONS:  She completed COVID-19 vaccination series at [facility] with initial dose in March 2021 followed by two boosters.      ROS:  General: -fever, -chills, -fatigue, +weight gain, -weight loss  Eyes: -vision changes, -redness, -discharge  ENT: -ear pain, -nasal congestion, -sore throat  Cardiovascular: -chest pain, -palpitations, -lower extremity edema  Respiratory: -cough, -shortness of breath  Gastrointestinal: -abdominal pain, -nausea, -vomiting, -diarrhea, -constipation, -blood in stool  Genitourinary: -dysuria, -hematuria,  "-frequency  Musculoskeletal: -joint pain, -muscle pain  Skin: -rash, -lesion  Neurological: -headache, -dizziness, -numbness, -tingling  Psychiatric: -anxiety, -depression, -sleep difficulty          Objective:   /82 (BP Location: Left arm, Patient Position: Sitting)   Pulse 72   Ht 5' 8" (1.727 m)   Wt 71.8 kg (158 lb 4.6 oz)   SpO2 98%   BMI 24.07 kg/m²        Physical Exam    General: No acute distress. Well-developed. Well-nourished.  Eyes: EOMI. Sclerae anicteric.  HENT: Normocephalic. Atraumatic. Nares patent. Moist oral mucosa.  Ears: Bilateral External ear normal   Cardiovascular: Regular rate. Regular rhythm. No murmurs. No rubs. No gallops. Normal S1, S2.  Respiratory: Normal respiratory effort. Clear to auscultation bilaterally. No rales. No rhonchi. No wheezing.  Abdomen: Soft. Non-tender. Non-distended. Normoactive bowel sounds.  Musculoskeletal: No  obvious deformity.  Extremities: No lower extremity edema.  Neurological: Alert & oriented x3. No slurred speech. Normal gait.  Psychiatric: Normal mood. Normal affect. Good insight. Good judgment.  Skin: Warm. Dry. No rash.        Assessment:       1. Polycystic kidney disease    2. Encounter to establish care          Assessment & Plan    IMPRESSION:  - Reviewed patient's history of polycystic kidney disease, an incidental finding from previous imaging  - Considered screening for aneurysms given family history, but noted Brain MRA already completed. Her father has an aneurysm in her abdomen. I am not sure if it is an aortic aneurysm. Will try to get more information.      POLYCYSTIC KIDNEY DISEASE:  - Noted that the patient was diagnosed with polycystic kidney disease 1.5 years ago as an incidental finding, with a family history of the condition in father and brother.  - Reviewed CT results revealing multiple simple and complex cysts in the kidneys, as well as cystic lesions in the liver.  - Recorded the patient's current blood pressure as " 120/82, which is considered good for the condition.  - Recommend checking blood pressure at home and keeping a log for the nephrologist to decide on treatment goals.  - Ordered kidney function tests.  - Follow-up with nephrologist Dr. Jamison on February 25th.  - Advised the patient to message via patient portal if blood pressure is consistently elevated at home.    KERATOCONUS:  - Noted that the patient has keratoconus and underwent collagen cross-linking surgery in both eyes in June and July.  - Confirmed that the patient underwent collagen cross-linking surgery to rebuild the cornea and prevent vision loss and the need for cornea transplant.    VITAMIN D DEFICIENCY:  - Ordered vitamin D level test.  - Noted that the patient had a low vitamin D level of 19 in the past.  - Planned to recheck vitamin D levels as part of basic lab work.    FEET SWELLING  - Noted that the patient reports feet swelling when traveling, which resolves within a few hours.  - Explained the potential need for compression stockings to manage travel-related feet swelling.      OTHER INSTRUCTIONS:  - Patient to continue current exercise regimen: 10 minutes each on bike, treadmill, and elliptical.  - Patient to incorporate more fruits and vegetables into diet.   - Will review if additional screening for abdominal aneurysms is necessary.  - Ordered basic labs including liver functions, kidney numbers, blood counts, cholesterol levels, and vitamin D.    Follow up in 3 months       Plan:       Polycystic kidney disease    Encounter to establish care  -     Comprehensive Metabolic Panel; Future; Expected date: 02/12/2025  -     CBC Auto Differential; Future; Expected date: 02/12/2025  -     Lipid Panel; Future; Expected date: 02/12/2025  -     Vitamin D; Future; Expected date: 02/12/2025  -     Hemoglobin A1C; Future; Expected date: 02/12/2025  -     TSH w/reflex to FT4; Future; Expected date: 02/12/2025  -     Ambulatory referral/consult to  Gynecology; Future; Expected date: 02/19/2025  -     HIV 1/2 Ag/Ab (4th Gen); Future; Expected date: 02/12/2025  -     HEPATITIS C ANTIBODY; Future; Expected date: 02/12/2025  -     TSH; Future; Expected date: 02/12/2025          Health Maintenance Due   Topic    Cervical Cancer Screening         This note was generated with the assistance of ambient listening technology. Verbal consent was obtained by the patient and accompanying visitor(s) for the recording of patient appointment to facilitate this note. I attest to having reviewed and edited the generated note for accuracy, though some syntax or spelling errors may persist. Please contact the author of this note for any clarification.

## 2025-02-14 ENCOUNTER — LAB VISIT (OUTPATIENT)
Dept: LAB | Facility: HOSPITAL | Age: 35
End: 2025-02-14
Attending: STUDENT IN AN ORGANIZED HEALTH CARE EDUCATION/TRAINING PROGRAM
Payer: COMMERCIAL

## 2025-02-14 DIAGNOSIS — Z76.89 ENCOUNTER TO ESTABLISH CARE: ICD-10-CM

## 2025-02-14 LAB
ALBUMIN SERPL BCP-MCNC: 3.7 G/DL (ref 3.5–5.2)
ALP SERPL-CCNC: 59 U/L (ref 40–150)
ALT SERPL W/O P-5'-P-CCNC: 11 U/L (ref 10–44)
ANION GAP SERPL CALC-SCNC: 9 MMOL/L (ref 8–16)
AST SERPL-CCNC: 24 U/L (ref 10–40)
BASOPHILS # BLD AUTO: 0.07 K/UL (ref 0–0.2)
BASOPHILS NFR BLD: 0.9 % (ref 0–1.9)
BILIRUB SERPL-MCNC: 0.4 MG/DL (ref 0.1–1)
BUN SERPL-MCNC: 8 MG/DL (ref 6–20)
CALCIUM SERPL-MCNC: 8.9 MG/DL (ref 8.7–10.5)
CHLORIDE SERPL-SCNC: 108 MMOL/L (ref 95–110)
CHOLEST SERPL-MCNC: 152 MG/DL (ref 120–199)
CHOLEST/HDLC SERPL: 4.3 {RATIO} (ref 2–5)
CO2 SERPL-SCNC: 21 MMOL/L (ref 23–29)
CREAT SERPL-MCNC: 0.7 MG/DL (ref 0.5–1.4)
DIFFERENTIAL METHOD BLD: ABNORMAL
EOSINOPHIL # BLD AUTO: 0.1 K/UL (ref 0–0.5)
EOSINOPHIL NFR BLD: 1.5 % (ref 0–8)
ERYTHROCYTE [DISTWIDTH] IN BLOOD BY AUTOMATED COUNT: 14.1 % (ref 11.5–14.5)
EST. GFR  (NO RACE VARIABLE): >60 ML/MIN/1.73 M^2
GLUCOSE SERPL-MCNC: 68 MG/DL (ref 70–110)
HCT VFR BLD AUTO: 36.5 % (ref 37–48.5)
HDLC SERPL-MCNC: 35 MG/DL (ref 40–75)
HDLC SERPL: 23 % (ref 20–50)
HGB BLD-MCNC: 11.6 G/DL (ref 12–16)
IMM GRANULOCYTES # BLD AUTO: 0.02 K/UL (ref 0–0.04)
IMM GRANULOCYTES NFR BLD AUTO: 0.2 % (ref 0–0.5)
LDLC SERPL CALC-MCNC: 102.8 MG/DL (ref 63–159)
LYMPHOCYTES # BLD AUTO: 2.2 K/UL (ref 1–4.8)
LYMPHOCYTES NFR BLD: 27 % (ref 18–48)
MCH RBC QN AUTO: 27.8 PG (ref 27–31)
MCHC RBC AUTO-ENTMCNC: 31.8 G/DL (ref 32–36)
MCV RBC AUTO: 87 FL (ref 82–98)
MONOCYTES # BLD AUTO: 0.6 K/UL (ref 0.3–1)
MONOCYTES NFR BLD: 6.8 % (ref 4–15)
NEUTROPHILS # BLD AUTO: 5.1 K/UL (ref 1.8–7.7)
NEUTROPHILS NFR BLD: 63.6 % (ref 38–73)
NONHDLC SERPL-MCNC: 117 MG/DL
NRBC BLD-RTO: 0 /100 WBC
PLATELET # BLD AUTO: 331 K/UL (ref 150–450)
PMV BLD AUTO: 10.9 FL (ref 9.2–12.9)
POTASSIUM SERPL-SCNC: 3.9 MMOL/L (ref 3.5–5.1)
PROT SERPL-MCNC: 7.7 G/DL (ref 6–8.4)
RBC # BLD AUTO: 4.18 M/UL (ref 4–5.4)
SODIUM SERPL-SCNC: 138 MMOL/L (ref 136–145)
TRIGL SERPL-MCNC: 71 MG/DL (ref 30–150)
TSH SERPL DL<=0.005 MIU/L-ACNC: 1.38 UIU/ML (ref 0.4–4)
WBC # BLD AUTO: 8.06 K/UL (ref 3.9–12.7)

## 2025-02-14 PROCEDURE — 84443 ASSAY THYROID STIM HORMONE: CPT | Performed by: STUDENT IN AN ORGANIZED HEALTH CARE EDUCATION/TRAINING PROGRAM

## 2025-02-14 PROCEDURE — 80061 LIPID PANEL: CPT | Performed by: STUDENT IN AN ORGANIZED HEALTH CARE EDUCATION/TRAINING PROGRAM

## 2025-02-14 PROCEDURE — 80053 COMPREHEN METABOLIC PANEL: CPT | Performed by: STUDENT IN AN ORGANIZED HEALTH CARE EDUCATION/TRAINING PROGRAM

## 2025-02-14 PROCEDURE — 83036 HEMOGLOBIN GLYCOSYLATED A1C: CPT | Performed by: STUDENT IN AN ORGANIZED HEALTH CARE EDUCATION/TRAINING PROGRAM

## 2025-02-14 PROCEDURE — 86803 HEPATITIS C AB TEST: CPT | Performed by: STUDENT IN AN ORGANIZED HEALTH CARE EDUCATION/TRAINING PROGRAM

## 2025-02-14 PROCEDURE — 85025 COMPLETE CBC W/AUTO DIFF WBC: CPT | Performed by: STUDENT IN AN ORGANIZED HEALTH CARE EDUCATION/TRAINING PROGRAM

## 2025-02-14 PROCEDURE — 82306 VITAMIN D 25 HYDROXY: CPT | Performed by: STUDENT IN AN ORGANIZED HEALTH CARE EDUCATION/TRAINING PROGRAM

## 2025-02-14 PROCEDURE — 87389 HIV-1 AG W/HIV-1&-2 AB AG IA: CPT | Performed by: STUDENT IN AN ORGANIZED HEALTH CARE EDUCATION/TRAINING PROGRAM

## 2025-02-15 LAB
25(OH)D3+25(OH)D2 SERPL-MCNC: 12 NG/ML (ref 30–96)
ESTIMATED AVG GLUCOSE: 105 MG/DL (ref 68–131)
HBA1C MFR BLD: 5.3 % (ref 4–5.6)
HCV AB SERPL QL IA: NORMAL
HIV 1+2 AB+HIV1 P24 AG SERPL QL IA: NORMAL

## 2025-02-17 ENCOUNTER — RESULTS FOLLOW-UP (OUTPATIENT)
Dept: INTERNAL MEDICINE | Facility: CLINIC | Age: 35
End: 2025-02-17
Payer: COMMERCIAL

## 2025-02-17 DIAGNOSIS — D64.9 ANEMIA, UNSPECIFIED TYPE: Primary | ICD-10-CM

## 2025-02-19 ENCOUNTER — APPOINTMENT (OUTPATIENT)
Dept: LAB | Facility: HOSPITAL | Age: 35
End: 2025-02-19
Attending: STUDENT IN AN ORGANIZED HEALTH CARE EDUCATION/TRAINING PROGRAM
Payer: COMMERCIAL

## 2025-02-19 RX ORDER — CHOLECALCIFEROL (VITAMIN D3) 25 MCG
1000 TABLET ORAL DAILY
Qty: 90 TABLET | Refills: 3 | Status: SHIPPED | OUTPATIENT
Start: 2025-02-19

## 2025-02-20 ENCOUNTER — PATIENT MESSAGE (OUTPATIENT)
Dept: INTERNAL MEDICINE | Facility: CLINIC | Age: 35
End: 2025-02-20
Payer: COMMERCIAL

## 2025-02-20 ENCOUNTER — RESULTS FOLLOW-UP (OUTPATIENT)
Dept: INTERNAL MEDICINE | Facility: CLINIC | Age: 35
End: 2025-02-20

## 2025-02-20 RX ORDER — FERROUS SULFATE 325(65) MG
325 TABLET ORAL
Qty: 90 TABLET | Refills: 1 | Status: SHIPPED | OUTPATIENT
Start: 2025-02-20